# Patient Record
Sex: FEMALE | Race: WHITE | NOT HISPANIC OR LATINO | Employment: UNEMPLOYED | URBAN - METROPOLITAN AREA
[De-identification: names, ages, dates, MRNs, and addresses within clinical notes are randomized per-mention and may not be internally consistent; named-entity substitution may affect disease eponyms.]

---

## 2018-04-02 ENCOUNTER — HOSPITAL ENCOUNTER (EMERGENCY)
Facility: HOSPITAL | Age: 28
Discharge: HOME | End: 2018-04-02
Attending: EMERGENCY MEDICINE | Admitting: EMERGENCY MEDICINE

## 2018-04-02 ENCOUNTER — APPOINTMENT (OUTPATIENT)
Dept: ULTRASOUND IMAGING | Facility: HOSPITAL | Age: 28
End: 2018-04-02

## 2018-04-02 VITALS
HEIGHT: 66 IN | SYSTOLIC BLOOD PRESSURE: 101 MMHG | TEMPERATURE: 98.4 F | OXYGEN SATURATION: 95 % | BODY MASS INDEX: 44.68 KG/M2 | HEART RATE: 69 BPM | DIASTOLIC BLOOD PRESSURE: 71 MMHG | WEIGHT: 278 LBS | RESPIRATION RATE: 18 BRPM

## 2018-04-02 DIAGNOSIS — R10.10 PAIN OF UPPER ABDOMEN: Primary | ICD-10-CM

## 2018-04-02 LAB
ALBUMIN SERPL-MCNC: 4.7 G/DL (ref 3.5–5.2)
ALBUMIN/GLOB SERPL: 1.7 G/DL
ALP SERPL-CCNC: 67 U/L (ref 39–117)
ALT SERPL W P-5'-P-CCNC: 51 U/L (ref 1–33)
ANION GAP SERPL CALCULATED.3IONS-SCNC: 17.6 MMOL/L
AST SERPL-CCNC: 44 U/L (ref 1–32)
BASOPHILS # BLD AUTO: 0.03 10*3/MM3 (ref 0–0.2)
BASOPHILS NFR BLD AUTO: 0.5 % (ref 0–1.5)
BILIRUB SERPL-MCNC: 0.3 MG/DL (ref 0.1–1.2)
BUN BLD-MCNC: 10 MG/DL (ref 6–20)
BUN/CREAT SERPL: 18.9 (ref 7–25)
CALCIUM SPEC-SCNC: 9.8 MG/DL (ref 8.6–10.5)
CHLORIDE SERPL-SCNC: 102 MMOL/L (ref 98–107)
CO2 SERPL-SCNC: 23.4 MMOL/L (ref 22–29)
CREAT BLD-MCNC: 0.53 MG/DL (ref 0.57–1)
DEPRECATED RDW RBC AUTO: 45.4 FL (ref 37–54)
EOSINOPHIL # BLD AUTO: 0.11 10*3/MM3 (ref 0–0.7)
EOSINOPHIL NFR BLD AUTO: 1.7 % (ref 0.3–6.2)
ERYTHROCYTE [DISTWIDTH] IN BLOOD BY AUTOMATED COUNT: 13.3 % (ref 11.7–13)
GFR SERPL CREATININE-BSD FRML MDRD: 141 ML/MIN/1.73
GLOBULIN UR ELPH-MCNC: 2.7 GM/DL
GLUCOSE BLD-MCNC: 119 MG/DL (ref 65–99)
HCG SERPL QL: NEGATIVE
HCT VFR BLD AUTO: 41 % (ref 35.6–45.5)
HGB BLD-MCNC: 13.1 G/DL (ref 11.9–15.5)
IMM GRANULOCYTES # BLD: 0.02 10*3/MM3 (ref 0–0.03)
IMM GRANULOCYTES NFR BLD: 0.3 % (ref 0–0.5)
LIPASE SERPL-CCNC: 19 U/L (ref 13–60)
LYMPHOCYTES # BLD AUTO: 2.03 10*3/MM3 (ref 0.9–4.8)
LYMPHOCYTES NFR BLD AUTO: 32.2 % (ref 19.6–45.3)
MCH RBC QN AUTO: 29.9 PG (ref 26.9–32)
MCHC RBC AUTO-ENTMCNC: 32 G/DL (ref 32.4–36.3)
MCV RBC AUTO: 93.6 FL (ref 80.5–98.2)
MONOCYTES # BLD AUTO: 0.66 10*3/MM3 (ref 0.2–1.2)
MONOCYTES NFR BLD AUTO: 10.5 % (ref 5–12)
NEUTROPHILS # BLD AUTO: 3.45 10*3/MM3 (ref 1.9–8.1)
NEUTROPHILS NFR BLD AUTO: 54.8 % (ref 42.7–76)
PLATELET # BLD AUTO: 272 10*3/MM3 (ref 140–500)
PMV BLD AUTO: 11.1 FL (ref 6–12)
POTASSIUM BLD-SCNC: 4.2 MMOL/L (ref 3.5–5.2)
PROT SERPL-MCNC: 7.4 G/DL (ref 6–8.5)
RBC # BLD AUTO: 4.38 10*6/MM3 (ref 3.9–5.2)
SODIUM BLD-SCNC: 143 MMOL/L (ref 136–145)
WBC NRBC COR # BLD: 6.3 10*3/MM3 (ref 4.5–10.7)

## 2018-04-02 PROCEDURE — 83690 ASSAY OF LIPASE: CPT | Performed by: EMERGENCY MEDICINE

## 2018-04-02 PROCEDURE — 76705 ECHO EXAM OF ABDOMEN: CPT

## 2018-04-02 PROCEDURE — 85025 COMPLETE CBC W/AUTO DIFF WBC: CPT | Performed by: EMERGENCY MEDICINE

## 2018-04-02 PROCEDURE — 80053 COMPREHEN METABOLIC PANEL: CPT | Performed by: EMERGENCY MEDICINE

## 2018-04-02 PROCEDURE — 84703 CHORIONIC GONADOTROPIN ASSAY: CPT | Performed by: EMERGENCY MEDICINE

## 2018-04-02 PROCEDURE — 99283 EMERGENCY DEPT VISIT LOW MDM: CPT

## 2018-04-02 RX ORDER — OMEPRAZOLE 40 MG/1
40 CAPSULE, DELAYED RELEASE ORAL DAILY
Qty: 15 CAPSULE | Refills: 0 | Status: SHIPPED | OUTPATIENT
Start: 2018-04-02 | End: 2023-01-19

## 2018-04-02 RX ORDER — SODIUM CHLORIDE 0.9 % (FLUSH) 0.9 %
10 SYRINGE (ML) INJECTION AS NEEDED
Status: DISCONTINUED | OUTPATIENT
Start: 2018-04-02 | End: 2018-04-02 | Stop reason: HOSPADM

## 2018-04-02 RX ORDER — FAMOTIDINE 20 MG/1
20 TABLET, FILM COATED ORAL ONCE
Status: COMPLETED | OUTPATIENT
Start: 2018-04-02 | End: 2018-04-02

## 2018-04-02 RX ORDER — SODIUM CHLORIDE 9 MG/ML
125 INJECTION, SOLUTION INTRAVENOUS CONTINUOUS
Status: DISCONTINUED | OUTPATIENT
Start: 2018-04-02 | End: 2018-04-02 | Stop reason: HOSPADM

## 2018-04-02 RX ORDER — DICYCLOMINE HYDROCHLORIDE 10 MG/1
10 CAPSULE ORAL ONCE
Status: COMPLETED | OUTPATIENT
Start: 2018-04-02 | End: 2018-04-02

## 2018-04-02 RX ORDER — DICYCLOMINE HYDROCHLORIDE 10 MG/1
10 CAPSULE ORAL
Qty: 40 CAPSULE | Refills: 0 | Status: SHIPPED | OUTPATIENT
Start: 2018-04-02 | End: 2023-01-19

## 2018-04-02 RX ORDER — ONDANSETRON 2 MG/ML
4 INJECTION INTRAMUSCULAR; INTRAVENOUS ONCE
Status: DISCONTINUED | OUTPATIENT
Start: 2018-04-02 | End: 2018-04-02 | Stop reason: HOSPADM

## 2018-04-02 RX ORDER — ONDANSETRON 4 MG/1
4 TABLET, FILM COATED ORAL EVERY 8 HOURS PRN
Qty: 10 TABLET | Refills: 0 | Status: SHIPPED | OUTPATIENT
Start: 2018-04-02 | End: 2023-01-19

## 2018-04-02 RX ADMIN — FAMOTIDINE 20 MG: 20 TABLET, FILM COATED ORAL at 05:33

## 2018-04-02 RX ADMIN — DICYCLOMINE HYDROCHLORIDE 10 MG: 10 CAPSULE ORAL at 05:33

## 2018-04-02 NOTE — ED PROVIDER NOTES
" EMERGENCY DEPARTMENT ENCOUNTER    CHIEF COMPLAINT  Chief Complaint: Abd pain  History given by: Pt  History limited by: Nothing  Room Number: 04/04  PMD: No Known Provider      HPI:  Pt is a 27 y.o. female who presents complaining of intermittent upper abd pain for one year, which the pt describes as \"gallbladder attacks\". The pt has had an episode for the last 3 days, which is unusual. The pt confirms fever, nausea, and diarrhea and denies vomiting. The pt states that a heating pad can improve the pain. The pt states that the pain usually starts before the pt has a BM.    Duration:  3 days (acute)  Onset: Gradual  Timing: Intermittent  Location: Upper abd  Radiation: None  Quality: \"pain\"  Intensity/Severity: Moderate  Progression: No change  Associated Symptoms: Fever, nausea, and diarrhea  Aggravating Factors: BM  Alleviating Factors: Heating pad  Previous Episodes: The pt has had intermittent episodes of the pain for 1 year.   Treatment before arrival: The pt has used heating pads for relief of the pain.     PAST MEDICAL HISTORY  Active Ambulatory Problems     Diagnosis Date Noted   • No Active Ambulatory Problems     Resolved Ambulatory Problems     Diagnosis Date Noted   • No Resolved Ambulatory Problems     Past Medical History:   Diagnosis Date   • Asthma        PAST SURGICAL HISTORY  Past Surgical History:   Procedure Laterality Date   • BREAST IMPLANT SURGERY     • LIPECTOMY         FAMILY HISTORY  History reviewed. No pertinent family history.    SOCIAL HISTORY  Social History     Social History   • Marital status:      Spouse name: N/A   • Number of children: N/A   • Years of education: N/A     Occupational History   • Not on file.     Social History Main Topics   • Smoking status: Never Smoker   • Smokeless tobacco: Not on file   • Alcohol use No   • Drug use: No   • Sexual activity: Defer     Other Topics Concern   • Not on file     Social History Narrative   • No narrative on file "       ALLERGIES  Review of patient's allergies indicates no known allergies.    REVIEW OF SYSTEMS  Review of Systems   Constitutional: Positive for fever.   HENT: Negative for sore throat.    Eyes: Negative.    Respiratory: Negative for cough and shortness of breath.    Cardiovascular: Negative for chest pain.   Gastrointestinal: Positive for abdominal pain, diarrhea and nausea. Negative for vomiting.   Genitourinary: Negative for dysuria.   Musculoskeletal: Negative for neck pain.   Skin: Negative for rash.   Allergic/Immunologic: Negative.    Neurological: Negative for weakness, numbness and headaches.   Hematological: Negative.    Psychiatric/Behavioral: Negative.    All other systems reviewed and are negative.      PHYSICAL EXAM  ED Triage Vitals [04/02/18 0316]   Temp Heart Rate Resp BP SpO2   98.2 °F (36.8 °C) 95 18 -- 98 %      Temp src Heart Rate Source Patient Position BP Location FiO2 (%)   Tympanic -- -- -- --       Physical Exam   Constitutional: She is oriented to person, place, and time and well-developed, well-nourished, and in no distress. No distress.   HENT:   Head: Normocephalic and atraumatic.   Eyes: EOM are normal. Pupils are equal, round, and reactive to light.   Neck: Normal range of motion. Neck supple.   Cardiovascular: Normal rate, regular rhythm and normal heart sounds.    Pulmonary/Chest: Effort normal and breath sounds normal. No respiratory distress.   Abdominal: Soft. There is tenderness (Mild) in the periumbilical area. There is no rebound and no guarding.   Musculoskeletal: Normal range of motion. She exhibits no edema.   Neurological: She is alert and oriented to person, place, and time. She has normal sensation and normal strength.   Skin: Skin is warm and dry. No rash noted.   Psychiatric: Mood and affect normal.   Nursing note and vitals reviewed.      LAB RESULTS  Lab Results (last 24 hours)     Procedure Component Value Units Date/Time    CBC & Differential [[id number]]  Collected:  04/02/18 0358    Specimen:  Blood Updated:  04/02/18 0414    Narrative:       The following orders were created for panel order CBC & Differential.  Procedure                               Abnormality         Status                     ---------                               -----------         ------                     CBC Auto Differential[[id number]]        Abnormal            Final result                 Please view results for these tests on the individual orders.    Comprehensive Metabolic Panel [[id number]]  (Abnormal) Collected:  04/02/18 0358    Specimen:  Blood Updated:  04/02/18 0448     Glucose 119 (H) mg/dL      BUN 10 mg/dL      Creatinine 0.53 (L) mg/dL      Sodium 143 mmol/L      Potassium 4.2 mmol/L      Chloride 102 mmol/L      CO2 23.4 mmol/L      Calcium 9.8 mg/dL      Total Protein 7.4 g/dL      Albumin 4.70 g/dL      ALT (SGPT) 51 (H) U/L      AST (SGOT) 44 (H) U/L      Alkaline Phosphatase 67 U/L      Total Bilirubin 0.3 mg/dL      eGFR Non African Amer 141 mL/min/1.73      Globulin 2.7 gm/dL      A/G Ratio 1.7 g/dL      BUN/Creatinine Ratio 18.9     Anion Gap 17.6 mmol/L     Lipase [[id number]]  (Normal) Collected:  04/02/18 0358    Specimen:  Blood Updated:  04/02/18 0430     Lipase 19 U/L     hCG, Serum, Qualitative [[id number]]  (Normal) Collected:  04/02/18 0358    Specimen:  Blood Updated:  04/02/18 0428     HCG Qualitative Negative    CBC Auto Differential [[id number]]  (Abnormal) Collected:  04/02/18 0358    Specimen:  Blood Updated:  04/02/18 0414     WBC 6.30 10*3/mm3      RBC 4.38 10*6/mm3      Hemoglobin 13.1 g/dL      Hematocrit 41.0 %      MCV 93.6 fL      MCH 29.9 pg      MCHC 32.0 (L) g/dL      RDW 13.3 (H) %      RDW-SD 45.4 fl      MPV 11.1 fL      Platelets 272 10*3/mm3      Neutrophil % 54.8 %      Lymphocyte % 32.2 %      Monocyte % 10.5 %      Eosinophil % 1.7 %      Basophil % 0.5 %      Immature Grans % 0.3 %      Neutrophils, Absolute 3.45 10*3/mm3       Lymphocytes, Absolute 2.03 10*3/mm3      Monocytes, Absolute 0.66 10*3/mm3      Eosinophils, Absolute 0.11 10*3/mm3      Basophils, Absolute 0.03 10*3/mm3      Immature Grans, Absolute 0.02 10*3/mm3           I ordered the above labs and reviewed the results    RADIOLOGY  US Gallbladder   Preliminary Result   No gallstones, no evidence for acute cholecystitis.                I ordered the above noted radiological studies. Interpreted by radiologist. Reviewed by me in PACS.       PROCEDURES  Procedures      PROGRESS AND CONSULTS  ED Course   0327 Ordered CMP, lipase, CBC, and US gallbladder for further evaluation. Ordered Zofran for nausea.     0457 Rechecked the patient and she is resting. Discussed pertinent lab and imaging results, including negative US gallbladder and lab results that show slightly elevated liver tests, which is overall normal for the American diet. Discussed the plan to discharge the pt with a plan for her to get a HIDA scan and scope outpatient with a GI physician for further evaluation of her gallbladder. Patient agrees with plan and all questions were addressed.     MEDICAL DECISION MAKING  Results were reviewed/discussed with the patient and they were also made aware of online access. Pt also made aware that some labs, such as cultures, will not be resulted during ER visit and follow up with PMD is necessary.     MDM  Number of Diagnoses or Management Options     Amount and/or Complexity of Data Reviewed  Clinical lab tests: ordered and reviewed (Labs unremarkable)  Tests in the radiology section of CPT®: ordered and reviewed (US gallbladder: Negative)  Decide to obtain previous medical records or to obtain history from someone other than the patient: yes    Patient Progress  Patient progress: stable         DIAGNOSIS  Final diagnoses:   Pain of upper abdomen       DISPOSITION  Disposition: Discharged.    Patient discharged in stable condition.    Reviewed implications of results, diagnosis,  meds, responsibility to follow up, warning signs and symptoms of possible worsening, potential complications and reasons to return to ER, including new or worsening symptoms.    Patient/Family voiced understanding of above instructions.    Discussed plan for discharge, as there is no emergent indication for admission.  Pt/family is agreeable and understands need for follow up and repeat testing.  Pt is aware that discharge does not mean that nothing is wrong but it indicates no emergency is present and they must continue care with follow-up as given below or physician of their choice.     FOLLOW-UP  Lucas Mcdowell MD  [address] [address]  [phone]    Call            Medication List      New Prescriptions    dicyclomine 10 MG capsule  Commonly known as:  BENTYL  Take 1 capsule by mouth 4 (Four) Times a Day Before Meals & at Bedtime.     omeprazole 40 MG capsule  Commonly known as:  priLOSEC  Take 1 capsule by mouth Daily.     ondansetron 4 MG tablet  Commonly known as:  ZOFRAN  Take 1 tablet by mouth Every 8 (Eight) Hours As Needed for Nausea.            Latest Documented Vital Signs:  As of 4:59 AM  BP- 116/74 HR- 95 Temp- 98.2 °F (36.8 °C) (Tympanic) O2 sat- 98%    --  Documentation assistance provided by lore Koo for Dr. Mcclendon.  Information recorded by the scribeva was done at my direction and has been verified and validated by me.     Mary Koo  04/02/18 0406       Mary Koo  04/02/18 0459       Dane Mcclendon MD  04/02/18 0686

## 2018-04-02 NOTE — ED TRIAGE NOTES
Pt ambulated to triage with steady gait.  Pt reports periumblicial pain X1 year; worsened since friday.  Issues with gallbladder in the past.  Pt states that she has been having issues with diarrhea as well.  Pt is alert and oriented X4, PERRLA, respirations are even and unlabored, chest rise and fall is equal in expansion.  Pt does not appear to be in distress at this time.  Pt denies fever, chills, blurred vision, chest pain, loss in control of bowel/bladder.  Pt appears stable.  First look completed.

## 2019-04-25 ENCOUNTER — TELEPHONE (OUTPATIENT)
Dept: OBSTETRICS AND GYNECOLOGY | Facility: CLINIC | Age: 29
End: 2019-04-25

## 2019-07-09 ENCOUNTER — OFFICE VISIT (OUTPATIENT)
Dept: GASTROENTEROLOGY | Facility: CLINIC | Age: 29
End: 2019-07-09

## 2019-07-09 VITALS
SYSTOLIC BLOOD PRESSURE: 122 MMHG | DIASTOLIC BLOOD PRESSURE: 76 MMHG | BODY MASS INDEX: 48.82 KG/M2 | HEIGHT: 65 IN | WEIGHT: 293 LBS

## 2019-07-09 DIAGNOSIS — R10.30 LOWER ABDOMINAL PAIN: Primary | ICD-10-CM

## 2019-07-09 DIAGNOSIS — R12 HEARTBURN: ICD-10-CM

## 2019-07-09 PROCEDURE — 99204 OFFICE O/P NEW MOD 45 MIN: CPT | Performed by: INTERNAL MEDICINE

## 2019-07-09 RX ORDER — METRONIDAZOLE 500 MG/1
500 TABLET ORAL 3 TIMES DAILY
Qty: 21 TABLET | Refills: 0 | Status: SHIPPED | OUTPATIENT
Start: 2019-07-09 | End: 2023-01-19

## 2019-07-09 NOTE — PROGRESS NOTES
Chief Complaint   Patient presents with   • Abdominal Pain   • Heartburn       Ivonne Ingram is a 28 y.o. female who presents with abdominal pain, diarrhea for 3 years    28-year-old female with abdominal pain and diarrhea for 3 years.  Abdominal pain is worse with movement and eating better after bowel movements.  She has 3 or 4 loose stools a day.  Occasional rectal bleeding.  Her abdominal pain is in the lower quadrants and does not radiate.  She had a CBC, CMP and ultrasound of the gallbladder 1 year ago that were normal        Past Medical History:   Diagnosis Date   • Asthma        Past Surgical History:   Procedure Laterality Date   • BREAST IMPLANT SURGERY     • LIPECTOMY           Current Outpatient Medications:   •  albuterol (PROVENTIL HFA;VENTOLIN HFA) 108 (90 BASE) MCG/ACT inhaler, Inhale 2 puffs Every 4 (Four) Hours As Needed for wheezing., Disp: , Rfl:   •  dicyclomine (BENTYL) 10 MG capsule, Take 1 capsule by mouth 4 (Four) Times a Day Before Meals & at Bedtime., Disp: 40 capsule, Rfl: 0  •  omeprazole (priLOSEC) 40 MG capsule, Take 1 capsule by mouth Daily., Disp: 15 capsule, Rfl: 0  •  ondansetron (ZOFRAN) 4 MG tablet, Take 1 tablet by mouth Every 8 (Eight) Hours As Needed for Nausea., Disp: 10 tablet, Rfl: 0  •  phenazopyridine (PYRIDIUM) 200 MG tablet, One tablet 3 x's a day as needed for symptoms, Disp: 10 tablet, Rfl: 0    No Known Allergies    Social History     Socioeconomic History   • Marital status:      Spouse name: Not on file   • Number of children: Not on file   • Years of education: Not on file   • Highest education level: Not on file   Tobacco Use   • Smoking status: Never Smoker   Substance and Sexual Activity   • Alcohol use: No   • Drug use: No   • Sexual activity: Defer       History reviewed. No pertinent family history.    Review of Systems   Gastrointestinal: Positive for abdominal distention, abdominal pain, diarrhea and nausea.   All other systems reviewed and are  negative.      Vitals:    07/09/19 0913   BP: 122/76       Physical Exam   Constitutional: She is oriented to person, place, and time. She appears well-developed and well-nourished.   HENT:   Head: Normocephalic and atraumatic.   Eyes: Pupils are equal, round, and reactive to light.   Cardiovascular: Normal rate, regular rhythm and normal heart sounds.   Pulmonary/Chest: Effort normal and breath sounds normal.   Abdominal: Soft. Bowel sounds are normal. She exhibits no shifting dullness, no distension, no pulsatile liver, no fluid wave, no abdominal bruit, no ascites, no pulsatile midline mass and no mass. There is no hepatosplenomegaly. There is no tenderness. There is no rigidity and no guarding. No hernia.   Musculoskeletal: Normal range of motion.   Neurological: She is alert and oriented to person, place, and time.   Skin: Skin is warm and dry.   Psychiatric: She has a normal mood and affect. Her behavior is normal. Thought content normal.   Nursing note and vitals reviewed.      Problem list    Abdominal pain  Diarrhea  Excessive belching with foul older  GERD      Assessment/Plan    based on the above issues we will plan for colonoscopy and EGD  Excessive belching can be associated with Giardia, she describes an unusual taste in her mouth after belching that does sound like Giardia, I do think we will treat her with Flagyl for 7 days  She can use FDgard as needed  She can use IBgard as needed  She has failed Bentyl therapy in the past I envision using Xifaxan in the future for IBS-D

## 2019-07-18 ENCOUNTER — OUTSIDE FACILITY SERVICE (OUTPATIENT)
Dept: GASTROENTEROLOGY | Facility: CLINIC | Age: 29
End: 2019-07-18

## 2019-07-18 PROCEDURE — OUTSIDEPOS PR OUTSIDE POS PLACEHOLDER: Performed by: INTERNAL MEDICINE

## 2021-04-16 ENCOUNTER — BULK ORDERING (OUTPATIENT)
Dept: CASE MANAGEMENT | Facility: OTHER | Age: 31
End: 2021-04-16

## 2021-04-16 DIAGNOSIS — Z23 IMMUNIZATION DUE: ICD-10-CM

## 2022-02-03 ENCOUNTER — OFFICE VISIT (OUTPATIENT)
Dept: GASTROENTEROLOGY | Facility: CLINIC | Age: 32
End: 2022-02-03

## 2022-02-03 VITALS — HEIGHT: 64 IN | BODY MASS INDEX: 50.02 KG/M2 | WEIGHT: 293 LBS

## 2022-02-03 DIAGNOSIS — E66.01 MORBID OBESITY: ICD-10-CM

## 2022-02-03 DIAGNOSIS — R79.89 ELEVATED LIVER FUNCTION TESTS: Primary | ICD-10-CM

## 2022-02-03 PROCEDURE — 99442 PR PHYS/QHP TELEPHONE EVALUATION 11-20 MIN: CPT | Performed by: NURSE PRACTITIONER

## 2022-02-07 ENCOUNTER — TELEPHONE (OUTPATIENT)
Dept: GASTROENTEROLOGY | Facility: CLINIC | Age: 32
End: 2022-02-07

## 2022-02-07 NOTE — TELEPHONE ENCOUNTER
----- Message from LLOYD Johnson sent at 2/3/2022 10:31 AM EST -----  Need to schedule an office lab draw once she is outside of her Covid quarantine timeframe as well as liver ultrasound.

## 2022-02-15 ENCOUNTER — APPOINTMENT (OUTPATIENT)
Dept: ULTRASOUND IMAGING | Facility: HOSPITAL | Age: 32
End: 2022-02-15

## 2022-06-02 NOTE — PROGRESS NOTES
Chief Complaint   Patient presents with   • Elevated Hepatic Enzymes       HPI     You have chosen to receive care through a telephone visit. Do you consent to use a telephone visit for your medical care today? yes    Ivonne Ingram is a  31 y.o. female here for a follow up visit for elevated liver function test.    Patient follows with Dr. Devine, new to me.    Reviewed labs from primary care provider's office drawn on January 5 --, , ALP 80, Keaton 80. .  Triglycerides 288, cholesterol 299, .    Patient tested positive for Covid 3 days ago.    Patient denies risk factors for liver disease however her BMI is over 56.  She has never had a blood transfusion, she does not drink alcohol, she denies history of IV drug use, she has never been exposed to hepatitis, denies tattoo work.  She rarely uses NSAIDs.    She does admit that she never followed through with endoscopic examination after she saw Dr. Devine last because she was fearful of procedures.  She reports feeling well for about a year after her last office visit but then get episodes of generalized abdominal discomfort associated with defecation.  These episodes are less severe than before and less frequent.  No rectal bleeding.  No diarrhea.    No upper GI complaints.    She is working with her primary care provider to correct TSH.     Past Medical History:   Diagnosis Date   • Asthma        Past Surgical History:   Procedure Laterality Date   • BREAST IMPLANT SURGERY     • LIPECTOMY         Scheduled Meds:     Continuous Infusions: No current facility-administered medications for this visit.      PRN Meds:     Allergies   Allergen Reactions   • Amoxicillin-Pot Clavulanate Nausea And Vomiting       Social History     Socioeconomic History   • Marital status:    Tobacco Use   • Smoking status: Never Smoker   Substance and Sexual Activity   • Alcohol use: No   • Drug use: No   • Sexual activity: Defer       History reviewed. No  3 pertinent family history.    Review of Systems   Constitutional: Negative for activity change, appetite change, fatigue, fever and unexpected weight change.   HENT: Negative for trouble swallowing.    Respiratory: Negative for apnea, cough, choking, chest tightness, shortness of breath and wheezing.    Cardiovascular: Negative for chest pain, palpitations and leg swelling.   Gastrointestinal: Positive for abdominal pain. Negative for abdominal distention, anal bleeding, blood in stool, constipation, diarrhea, nausea, rectal pain and vomiting.       There were no vitals filed for this visit.    Physical Exam unable to be performed as visit was done over the phone.    Assessment    Diagnoses and all orders for this visit:    1. Elevated liver function tests (Primary)  -     Alpha - 1 - Antitrypsin  -     SERGIO  -     Anti-Smooth Muscle Antibody Titer  -     Ceruloplasmin  -     Gamma GT  -     Hemochromatosis Mutation  -     Hepatitis Panel, Acute  -     Mitochondrial Antibodies, M2  -     US Liver  -     US Gallbladder  -     Celiac Comprehensive Panel  -     Iron and TIBC  -     Ferritin  -     Protein Elec + Interp, Serum  -     WHITE Fibrosure    2. Morbid obesity (HCC)  -     WHITE Fibrosure       Plan    Pleasant 31-year-old female seen today via telehealth visit/phone call for elevated liver function test.  Suspect fatty liver disease in the setting of morbid obesity but will arrange serologic work-up to rule out autoimmune or viral component as well as metabolic issues.  Arrange dedicated liver ultrasound.  Depending on the quality of ultrasound given her body habitus we may need to move to CT versus MR imaging.  Encourage low-fat diet, weight loss, the avoidance of NSAIDs and alcohol in the interim.  Further recommendations and follow-up pending aforementioned work-up.    (Telehealth visit/phone call 25 minutes duration)         LLOYD Johnson  [facility]  [address] [place] [address] - [facility]  [address]  [place] [address]  Office: [phone]

## 2022-07-21 ENCOUNTER — OFFICE VISIT (OUTPATIENT)
Dept: OBSTETRICS AND GYNECOLOGY | Facility: CLINIC | Age: 32
End: 2022-07-21

## 2022-07-21 VITALS
DIASTOLIC BLOOD PRESSURE: 83 MMHG | HEIGHT: 64 IN | WEIGHT: 293 LBS | SYSTOLIC BLOOD PRESSURE: 119 MMHG | BODY MASS INDEX: 50.02 KG/M2

## 2022-07-21 DIAGNOSIS — Z11.3 SCREENING EXAMINATION FOR STD (SEXUALLY TRANSMITTED DISEASE): ICD-10-CM

## 2022-07-21 DIAGNOSIS — E28.2 PCOS (POLYCYSTIC OVARIAN SYNDROME): ICD-10-CM

## 2022-07-21 DIAGNOSIS — R10.819 SUPRAPUBIC TENDERNESS: ICD-10-CM

## 2022-07-21 DIAGNOSIS — N93.9 ABNORMAL UTERINE BLEEDING (AUB): Primary | ICD-10-CM

## 2022-07-21 DIAGNOSIS — Z12.4 CERVICAL CANCER SCREENING: ICD-10-CM

## 2022-07-21 PROCEDURE — 99203 OFFICE O/P NEW LOW 30 MIN: CPT | Performed by: STUDENT IN AN ORGANIZED HEALTH CARE EDUCATION/TRAINING PROGRAM

## 2022-07-23 LAB
BACTERIA UR CULT: NORMAL
BACTERIA UR CULT: NORMAL

## 2022-07-25 ENCOUNTER — PATIENT ROUNDING (BHMG ONLY) (OUTPATIENT)
Dept: OBSTETRICS AND GYNECOLOGY | Facility: CLINIC | Age: 32
End: 2022-07-25

## 2022-07-25 ENCOUNTER — PATIENT MESSAGE (OUTPATIENT)
Dept: OBSTETRICS AND GYNECOLOGY | Facility: CLINIC | Age: 32
End: 2022-07-25

## 2022-07-25 LAB
C TRACH RRNA CVX QL NAA+PROBE: NEGATIVE
CONV .: NORMAL
CYTOLOGIST CVX/VAG CYTO: NORMAL
CYTOLOGY CVX/VAG DOC CYTO: NORMAL
CYTOLOGY CVX/VAG DOC THIN PREP: NORMAL
DX ICD CODE: NORMAL
HIV 1 & 2 AB SER-IMP: NORMAL
N GONORRHOEA RRNA CVX QL NAA+PROBE: NEGATIVE
OTHER STN SPEC: NORMAL
STAT OF ADQ CVX/VAG CYTO-IMP: NORMAL
T VAGINALIS RRNA SPEC QL NAA+PROBE: NEGATIVE

## 2022-07-25 NOTE — PROGRESS NOTES
My chart message has been sent to the patient for PATIENT ROUNDING with [facility] [place].

## 2022-09-15 ENCOUNTER — OFFICE VISIT (OUTPATIENT)
Dept: OBSTETRICS AND GYNECOLOGY | Facility: CLINIC | Age: 32
End: 2022-09-15

## 2022-09-15 VITALS
SYSTOLIC BLOOD PRESSURE: 126 MMHG | DIASTOLIC BLOOD PRESSURE: 76 MMHG | BODY MASS INDEX: 50.02 KG/M2 | HEIGHT: 64 IN | WEIGHT: 293 LBS

## 2022-09-15 DIAGNOSIS — N93.9 ABNORMAL UTERINE BLEEDING (AUB): Primary | ICD-10-CM

## 2022-09-15 DIAGNOSIS — L08.9 SKIN INFECTION: ICD-10-CM

## 2022-09-15 DIAGNOSIS — N64.89 BREAST ASYMMETRY IN FEMALE: ICD-10-CM

## 2022-09-15 DIAGNOSIS — Z98.82 PRESENCE OF LEFT BREAST IMPLANT: ICD-10-CM

## 2022-09-15 DIAGNOSIS — B37.9 YEAST INFECTION: ICD-10-CM

## 2022-09-15 PROCEDURE — 99213 OFFICE O/P EST LOW 20 MIN: CPT | Performed by: STUDENT IN AN ORGANIZED HEALTH CARE EDUCATION/TRAINING PROGRAM

## 2022-09-15 RX ORDER — CLINDAMYCIN PHOSPHATE 10 MG/G
1 GEL TOPICAL 2 TIMES DAILY
Qty: 60 G | Refills: 0 | Status: SHIPPED | OUTPATIENT
Start: 2022-09-15 | End: 2022-09-22

## 2022-09-15 RX ORDER — MEDROXYPROGESTERONE ACETATE 10 MG/1
10 TABLET ORAL DAILY
Qty: 10 TABLET | Refills: 0 | Status: SHIPPED | OUTPATIENT
Start: 2022-09-15 | End: 2022-11-03

## 2022-09-15 RX ORDER — FLUCONAZOLE 150 MG/1
150 TABLET ORAL
Qty: 2 TABLET | Refills: 0 | Status: SHIPPED | OUTPATIENT
Start: 2022-09-15 | End: 2023-01-19

## 2022-09-23 ENCOUNTER — TELEPHONE (OUTPATIENT)
Dept: OBSTETRICS AND GYNECOLOGY | Facility: CLINIC | Age: 32
End: 2022-09-23

## 2022-09-23 NOTE — TELEPHONE ENCOUNTER
l     Caller: Ivonne Ingram    Relationship to patient: Self    Best call back number: [phone]    [facility] A PROVIDER AT THE CLINIC THEY WILL BE CLOSING OUT THIS REFERRAL.

## 2022-11-03 RX ORDER — MEDROXYPROGESTERONE ACETATE 10 MG/1
10 TABLET ORAL DAILY
Qty: 10 TABLET | Refills: 0 | Status: SHIPPED | OUTPATIENT
Start: 2022-11-03 | End: 2022-11-13

## 2023-01-04 ENCOUNTER — TELEPHONE (OUTPATIENT)
Dept: OBSTETRICS AND GYNECOLOGY | Facility: CLINIC | Age: 33
End: 2023-01-04
Payer: COMMERCIAL

## 2023-01-04 DIAGNOSIS — L08.9 SKIN INFECTION: Primary | ICD-10-CM

## 2023-01-04 NOTE — TELEPHONE ENCOUNTER
New Ob pt, believes LMP was 10/1/2022 but has abnormal bleeding, so cannot be certain-called to schedule initial visit for 1/19/2023.      Pt was seen at [facility] ED on 1/1/23, dx'd w/UTI.    Asking if she can still receive an alternative medication to the clindagel rx'd in September-she was informed she would get a new rx, but never received it, is just now following up on it.     Please advise.     Pt # [phone]

## 2023-01-08 RX ORDER — CLINDAMYCIN PHOSPHATE 11.9 MG/ML
SOLUTION TOPICAL 2 TIMES DAILY
Qty: 30 ML | Refills: 0 | Status: SHIPPED | OUTPATIENT
Start: 2023-01-08 | End: 2023-01-15

## 2023-01-18 ENCOUNTER — TELEPHONE (OUTPATIENT)
Dept: OBSTETRICS AND GYNECOLOGY | Facility: CLINIC | Age: 33
End: 2023-01-18
Payer: COMMERCIAL

## 2023-01-18 NOTE — TELEPHONE ENCOUNTER
Pt informed.  She did not specify what she was going to do today, but implied she will keep her new Ob appt for tomorrow.

## 2023-01-18 NOTE — TELEPHONE ENCOUNTER
Patient would like to know if she can be seen today, She is having bad pain in her lower abdomen she went to the ED for the same pain and was told she has UTI and was gave some antibiotics to treat but is still experiencing some pain.      Please advise,  Thank you

## 2023-01-18 NOTE — TELEPHONE ENCOUNTER
There is not a way I could work her in today and I don't know if Wilma has any spots. I see she is on for tomorrow. If she can wait until tomorrow then advise her to, otherwise have her go to urgent care. Thanks!

## 2023-01-19 ENCOUNTER — INITIAL PRENATAL (OUTPATIENT)
Dept: OBSTETRICS AND GYNECOLOGY | Facility: CLINIC | Age: 33
End: 2023-01-19
Payer: COMMERCIAL

## 2023-01-19 VITALS — SYSTOLIC BLOOD PRESSURE: 118 MMHG | BODY MASS INDEX: 51.98 KG/M2 | WEIGHT: 293 LBS | DIASTOLIC BLOOD PRESSURE: 80 MMHG

## 2023-01-19 DIAGNOSIS — E03.9 HYPOTHYROIDISM AFFECTING PREGNANCY, ANTEPARTUM: ICD-10-CM

## 2023-01-19 DIAGNOSIS — Z34.90 PREGNANCY, UNSPECIFIED GESTATIONAL AGE: Primary | ICD-10-CM

## 2023-01-19 DIAGNOSIS — K76.0 FATTY LIVER IN MOTHER DURING PREGNANCY: ICD-10-CM

## 2023-01-19 DIAGNOSIS — R10.9 ABDOMINAL PAIN AFFECTING PREGNANCY: ICD-10-CM

## 2023-01-19 DIAGNOSIS — O99.280 HYPOTHYROIDISM AFFECTING PREGNANCY, ANTEPARTUM: ICD-10-CM

## 2023-01-19 DIAGNOSIS — O26.899 ABDOMINAL PAIN AFFECTING PREGNANCY: ICD-10-CM

## 2023-01-19 DIAGNOSIS — O99.519 ASTHMA AFFECTING PREGNANCY, ANTEPARTUM: ICD-10-CM

## 2023-01-19 DIAGNOSIS — Z34.91 PRENATAL CARE IN FIRST TRIMESTER: ICD-10-CM

## 2023-01-19 DIAGNOSIS — O26.619 FATTY LIVER IN MOTHER DURING PREGNANCY: ICD-10-CM

## 2023-01-19 DIAGNOSIS — E66.01 MORBID OBESITY WITH BMI OF 50.0-59.9, ADULT: ICD-10-CM

## 2023-01-19 DIAGNOSIS — J45.909 ASTHMA AFFECTING PREGNANCY, ANTEPARTUM: ICD-10-CM

## 2023-01-19 LAB
B-HCG UR QL: POSITIVE
EXPIRATION DATE: ABNORMAL
GLUCOSE UR STRIP-MCNC: NEGATIVE MG/DL
INTERNAL NEGATIVE CONTROL: NEGATIVE
INTERNAL POSITIVE CONTROL: POSITIVE
Lab: ABNORMAL
PROT UR STRIP-MCNC: NEGATIVE MG/DL

## 2023-01-19 PROCEDURE — 99214 OFFICE O/P EST MOD 30 MIN: CPT | Performed by: STUDENT IN AN ORGANIZED HEALTH CARE EDUCATION/TRAINING PROGRAM

## 2023-01-19 PROCEDURE — 81025 URINE PREGNANCY TEST: CPT | Performed by: STUDENT IN AN ORGANIZED HEALTH CARE EDUCATION/TRAINING PROGRAM

## 2023-01-20 LAB
ABO GROUP BLD: ABNORMAL
ALBUMIN SERPL-MCNC: 4.4 G/DL (ref 3.9–5)
ALBUMIN/GLOB SERPL: 1.4 {RATIO} (ref 1.2–2.2)
ALP SERPL-CCNC: 65 IU/L (ref 44–121)
ALT SERPL-CCNC: 60 IU/L (ref 0–32)
AMPHETAMINES UR QL SCN: NEGATIVE NG/ML
AST SERPL-CCNC: 67 IU/L (ref 0–40)
BARBITURATES UR QL SCN: NEGATIVE NG/ML
BASOPHILS # BLD AUTO: 0.1 X10E3/UL (ref 0–0.2)
BASOPHILS NFR BLD AUTO: 1 %
BENZODIAZ UR QL SCN: NEGATIVE NG/ML
BILIRUB SERPL-MCNC: 0.3 MG/DL (ref 0–1.2)
BLD GP AB SCN SERPL QL: NEGATIVE
BUN SERPL-MCNC: 8 MG/DL (ref 6–20)
BUN/CREAT SERPL: 13 (ref 9–23)
BZE UR QL SCN: NEGATIVE NG/ML
CALCIUM SERPL-MCNC: 9.4 MG/DL (ref 8.7–10.2)
CANNABINOIDS UR QL SCN: NEGATIVE NG/ML
CHLORIDE SERPL-SCNC: 101 MMOL/L (ref 96–106)
CO2 SERPL-SCNC: 23 MMOL/L (ref 20–29)
CREAT SERPL-MCNC: 0.6 MG/DL (ref 0.57–1)
CREAT UR-MCNC: 190 MG/DL (ref 20–300)
EGFRCR SERPLBLD CKD-EPI 2021: 124 ML/MIN/1.73
EOSINOPHIL # BLD AUTO: 0.1 X10E3/UL (ref 0–0.4)
EOSINOPHIL NFR BLD AUTO: 1 %
ERYTHROCYTE [DISTWIDTH] IN BLOOD BY AUTOMATED COUNT: 15.4 % (ref 11.7–15.4)
GLOBULIN SER CALC-MCNC: 3.2 G/DL (ref 1.5–4.5)
GLUCOSE SERPL-MCNC: 91 MG/DL (ref 70–99)
HBV SURFACE AG SERPL QL IA: NEGATIVE
HCT VFR BLD AUTO: 34.4 % (ref 34–46.6)
HCV AB S/CO SERPL IA: <0.1 S/CO RATIO (ref 0–0.9)
HGB BLD-MCNC: 11.3 G/DL (ref 11.1–15.9)
HIV 1+2 AB+HIV1 P24 AG SERPL QL IA: NON REACTIVE
IMM GRANULOCYTES # BLD AUTO: 0 X10E3/UL (ref 0–0.1)
IMM GRANULOCYTES NFR BLD AUTO: 0 %
LABORATORY COMMENT REPORT: NORMAL
LYMPHOCYTES # BLD AUTO: 3.1 X10E3/UL (ref 0.7–3.1)
LYMPHOCYTES NFR BLD AUTO: 31 %
MCH RBC QN AUTO: 26.5 PG (ref 26.6–33)
MCHC RBC AUTO-ENTMCNC: 32.8 G/DL (ref 31.5–35.7)
MCV RBC AUTO: 81 FL (ref 79–97)
METHADONE UR QL SCN: NEGATIVE NG/ML
MONOCYTES # BLD AUTO: 0.6 X10E3/UL (ref 0.1–0.9)
MONOCYTES NFR BLD AUTO: 6 %
NEUTROPHILS # BLD AUTO: 6.2 X10E3/UL (ref 1.4–7)
NEUTROPHILS NFR BLD AUTO: 61 %
OPIATES UR QL SCN: NEGATIVE NG/ML
OXYCODONE+OXYMORPHONE UR QL SCN: NEGATIVE NG/ML
PCP UR QL: NEGATIVE NG/ML
PH UR: 5.4 [PH] (ref 4.5–8.9)
PLATELET # BLD AUTO: 394 X10E3/UL (ref 150–450)
POTASSIUM SERPL-SCNC: 4 MMOL/L (ref 3.5–5.2)
PROPOXYPH UR QL SCN: NEGATIVE NG/ML
PROT SERPL-MCNC: 7.6 G/DL (ref 6–8.5)
RBC # BLD AUTO: 4.27 X10E6/UL (ref 3.77–5.28)
RH BLD: POSITIVE
RPR SER QL: NON REACTIVE
RUBV IGG SERPL IA-ACNC: 2.97 INDEX
SODIUM SERPL-SCNC: 139 MMOL/L (ref 134–144)
T4 FREE SERPL-MCNC: 0.77 NG/DL (ref 0.82–1.77)
TSH SERPL DL<=0.005 MIU/L-ACNC: 11.2 UIU/ML (ref 0.45–4.5)
WBC # BLD AUTO: 10 X10E3/UL (ref 3.4–10.8)

## 2023-01-21 LAB
BACTERIA UR CULT: NORMAL
BACTERIA UR CULT: NORMAL

## 2023-01-23 ENCOUNTER — TELEPHONE (OUTPATIENT)
Dept: OBSTETRICS AND GYNECOLOGY | Facility: CLINIC | Age: 33
End: 2023-01-23
Payer: COMMERCIAL

## 2023-01-23 DIAGNOSIS — O99.280 HYPOTHYROIDISM AFFECTING PREGNANCY, ANTEPARTUM: Primary | ICD-10-CM

## 2023-01-23 DIAGNOSIS — L29.9 ITCHING: ICD-10-CM

## 2023-01-23 DIAGNOSIS — E03.9 HYPOTHYROIDISM AFFECTING PREGNANCY, ANTEPARTUM: Primary | ICD-10-CM

## 2023-01-23 RX ORDER — TRIAMCINOLONE ACETONIDE 5 MG/G
1 CREAM TOPICAL 2 TIMES DAILY
Qty: 454 G | Refills: 0 | Status: SHIPPED | OUTPATIENT
Start: 2023-01-23 | End: 2023-02-09 | Stop reason: SDUPTHER

## 2023-01-23 RX ORDER — LEVOTHYROXINE SODIUM 0.03 MG/1
25 TABLET ORAL
Qty: 30 TABLET | Refills: 1 | Status: SHIPPED | OUTPATIENT
Start: 2023-01-23 | End: 2023-02-09 | Stop reason: SDUPTHER

## 2023-01-23 NOTE — TELEPHONE ENCOUNTER
Pt called wanting to speak to you about all her lab results from 1/19 saying she should be getting medication soon as well.

## 2023-01-26 ENCOUNTER — TELEPHONE (OUTPATIENT)
Dept: OBSTETRICS AND GYNECOLOGY | Facility: CLINIC | Age: 33
End: 2023-01-26
Payer: COMMERCIAL

## 2023-01-26 LAB
CFDNA.FET/CFDNA.TOTAL SFR FETUS: NORMAL %
CITATION REF LAB TEST: NORMAL
FET 13+18+21+X+Y ANEUP PLAS.CFDNA: NEGATIVE
FET CHR 21 TS PLAS.CFDNA QL: NEGATIVE
FET SEX PLAS.CFDNA DOSAGE CFDNA: NORMAL
FET TS 13 RISK PLAS.CFDNA QL: NEGATIVE
FET TS 18 RISK WBC.DNA+CFDNA QL: NEGATIVE
GA EST FROM CONCEPTION DATE: NORMAL D
GESTATIONAL AGE > 9:: YES
LAB DIRECTOR NAME PROVIDER: NORMAL
LAB DIRECTOR NAME PROVIDER: NORMAL
LABORATORY COMMENT REPORT: NORMAL
LIMITATIONS OF THE TEST: NORMAL
NEGATIVE PREDICTIVE VALUE: NORMAL
NOTE: NORMAL
PERFORMANCE CHARACTERISTICS: NORMAL
POSITIVE PREDICTIVE VALUE: NORMAL
REF LAB TEST METHOD: NORMAL
TEST PERFORMANCE INFO SPEC: NORMAL

## 2023-01-26 NOTE — TELEPHONE ENCOUNTER
Please call and let her know that it returned low risk for Down syndrome, trisomy 18, and trisomy 13. However, there was a low level of cell free DNA that these results could be off and we should consider repeating at next visit. If she would like to know the gender, it is a boy. Thanks!

## 2023-02-03 PROBLEM — E03.9 HYPOTHYROIDISM (ACQUIRED): Status: ACTIVE | Noted: 2023-02-03

## 2023-02-03 PROBLEM — Z34.90 PREGNANCY: Status: ACTIVE | Noted: 2023-02-03

## 2023-02-09 ENCOUNTER — ROUTINE PRENATAL (OUTPATIENT)
Dept: OBSTETRICS AND GYNECOLOGY | Facility: CLINIC | Age: 33
End: 2023-02-09
Payer: COMMERCIAL

## 2023-02-09 VITALS — DIASTOLIC BLOOD PRESSURE: 78 MMHG | WEIGHT: 293 LBS | SYSTOLIC BLOOD PRESSURE: 120 MMHG | BODY MASS INDEX: 52.16 KG/M2

## 2023-02-09 DIAGNOSIS — O20.9 VAGINAL BLEEDING AFFECTING EARLY PREGNANCY: ICD-10-CM

## 2023-02-09 DIAGNOSIS — O26.619 FATTY LIVER IN MOTHER DURING PREGNANCY: ICD-10-CM

## 2023-02-09 DIAGNOSIS — K76.0 FATTY LIVER IN MOTHER DURING PREGNANCY: ICD-10-CM

## 2023-02-09 DIAGNOSIS — Z3A.12 12 WEEKS GESTATION OF PREGNANCY: Primary | ICD-10-CM

## 2023-02-09 DIAGNOSIS — O26.891 LOW BACK PAIN DURING PREGNANCY, FIRST TRIMESTER: ICD-10-CM

## 2023-02-09 DIAGNOSIS — E66.01 MORBID OBESITY WITH BMI OF 50.0-59.9, ADULT: ICD-10-CM

## 2023-02-09 DIAGNOSIS — M54.50 LOW BACK PAIN DURING PREGNANCY, FIRST TRIMESTER: ICD-10-CM

## 2023-02-09 DIAGNOSIS — L29.9 ITCHING: ICD-10-CM

## 2023-02-09 DIAGNOSIS — Z34.91 PRENATAL CARE IN FIRST TRIMESTER: ICD-10-CM

## 2023-02-09 DIAGNOSIS — N85.6 UTERINE SYNECHIAE: ICD-10-CM

## 2023-02-09 DIAGNOSIS — O99.280 HYPOTHYROIDISM AFFECTING PREGNANCY, ANTEPARTUM: ICD-10-CM

## 2023-02-09 DIAGNOSIS — E03.9 HYPOTHYROIDISM AFFECTING PREGNANCY, ANTEPARTUM: ICD-10-CM

## 2023-02-09 LAB
GLUCOSE UR STRIP-MCNC: NEGATIVE MG/DL
PROT UR STRIP-MCNC: ABNORMAL MG/DL

## 2023-02-09 PROCEDURE — 99214 OFFICE O/P EST MOD 30 MIN: CPT | Performed by: STUDENT IN AN ORGANIZED HEALTH CARE EDUCATION/TRAINING PROGRAM

## 2023-02-09 RX ORDER — TRIAMCINOLONE ACETONIDE 5 MG/G
1 CREAM TOPICAL 2 TIMES DAILY
Qty: 454 G | Refills: 0 | Status: SHIPPED | OUTPATIENT
Start: 2023-02-09

## 2023-02-09 RX ORDER — LEVOTHYROXINE SODIUM 0.03 MG/1
25 TABLET ORAL
Qty: 30 TABLET | Refills: 1 | Status: SHIPPED | OUTPATIENT
Start: 2023-02-09

## 2023-02-09 RX ORDER — ASPIRIN 81 MG/1
81 TABLET ORAL DAILY
Qty: 60 TABLET | Refills: 3 | Status: SHIPPED | OUTPATIENT
Start: 2023-02-09

## 2023-02-09 NOTE — PROGRESS NOTES
Chief Complaint   Patient presents with   • Routine Prenatal Visit      Ivonne Ingram is a 32 y.o.  at 12w5d who presents for routine prenatal visit. She has multiple complaints today. She first reports that she has starting having low back pain that sometimes radiates to her abdomen in addition to mid back pain that occurred before pregnancy. She reports that in her last 2 pregnancies, she went to physical therapy. Denies dysuria, urinary frequency, urinary urgency or blood in her urine. She is also concerned with vaginal spotting that began 2-3 days ago, initially with wiping and then she noted it on her underwear and in the toilet bowl. Denies vaginal discharge. She reports that the bleeding has started to ease up last night. Denies any today. She reports mild abdominal cramping. She thinks she has been feeling fetal movement until yesterday.   She has not yet started her synthroid medication for management of hypothyroidism.     /78   Wt (!) 138 kg (304 lb)   LMP  (LMP Unknown)   BMI 52.16 kg/m²    Gen: well appearing, NAD   Abd: gravid, nontender  Back: No CVA tenderness, mild paraspinal muscle tenderness of lumbar and lumbosacral areas. Normal ROM.  Pelvis: normal external female genitalia, normal vulva, normal appearing vaginal mucosa, normal appearing cervix without active bleeding, cervix visually closed.   See OB Flowsheet    ASSESSMENT:   1. IUP at 12w5d   2. Prenatal care in first trimester  3. Morbid obesity- BMI 52 -will plan to start ASA 81 mg daily and continue through pregnancy. Prescription for ASA 81 mg daily sent to her pharmacy.   4. Asthma affecting pregnancy- rescue albuterol inhaler as needed   5. Fatty liver disease with elevated LFTs - (ALT 60, AST 67 on 23 decreased from previous elevation on 23 with ALT 93, ), will continue to monitor LFTs every trimester.   6. Hypothyroidism affecting pregnancy - TSH 11.2, free T4 0.77 on NOB labs, patient advised to  start Synthroid 25 mcg daily which she has been non-compliant with so far. Strongly encouraged the patient to start medication as it can lead to cognitive and neuropsychologic development delays and other increased risks like preeclampsia and  delivery in pregnancy.   7. Low back pain - referral to physical therapy placed for evaluation and treatment.  8. 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 Ultrasound performed today that noted fetus measuring 12 weeks with uterine synechia visualized. Discussed that we will follow this and that it can lead to growth restriction,  labor, or abnormal placentation. Will plan to repeat ultrasound at next visit.     PLAN:  See updated Problem List   Reviewed expectations of this stage of pregnancy.   First trimester bleeding/pain precautions reviewed.  Return in about 4 weeks (around 3/9/2023) for prenatal visit and ultrasound.    Zeinab Blue MD

## 2023-02-14 ENCOUNTER — TELEPHONE (OUTPATIENT)
Dept: OBSTETRICS AND GYNECOLOGY | Facility: CLINIC | Age: 33
End: 2023-02-14
Payer: COMMERCIAL

## 2023-02-14 NOTE — TELEPHONE ENCOUNTER
Pt states she did 'something' to her back over the weekend and is now in excruciating pain. Pt says she can barely walk or move. She is wanting to be seen in our office within the next few days, I advised her she may need to go to ED. Pt says she will not go there because she doesn't want to be exposed to COVID and other germs. What do you advise I tell pt? Would you like for me to schedule her to be soon with you.     Please advise,   Thank you

## 2023-02-15 DIAGNOSIS — O99.891 BACK PAIN AFFECTING PREGNANCY IN FIRST TRIMESTER: Primary | ICD-10-CM

## 2023-02-15 DIAGNOSIS — M54.9 BACK PAIN AFFECTING PREGNANCY IN FIRST TRIMESTER: Primary | ICD-10-CM

## 2023-02-15 RX ORDER — CYCLOBENZAPRINE HCL 10 MG
10 TABLET ORAL EVERY 8 HOURS PRN
Qty: 30 TABLET | Refills: 1 | Status: SHIPPED | OUTPATIENT
Start: 2023-02-15 | End: 2024-02-15

## 2023-02-15 NOTE — TELEPHONE ENCOUNTER
13w 4d ob pt called again today to report extreme back pain.  States she can barely move.   Requesting to be seen in office.     Please advise.     Pt # [phone]

## 2023-02-15 NOTE — TELEPHONE ENCOUNTER
Pt informed of rx and to use Tylenol & warm compresses as needed.  Also reminded pt of referral to PT.  They have tried to call her once to schedule, pt states she did not get the voicemail.  Provided pt w/phone number to call to schedule w/[facility]. [phone]

## 2023-02-15 NOTE — TELEPHONE ENCOUNTER
Please let her know that I can send in a muscle relaxer that she can take and she can use tylenol as needed along with hot compresses to the back. I have already referred her to physical therapy. She should see her primary care physician if it continues and is not helped by the muscle relaxer or tylenol.

## 2023-03-09 ENCOUNTER — ROUTINE PRENATAL (OUTPATIENT)
Dept: OBSTETRICS AND GYNECOLOGY | Facility: CLINIC | Age: 33
End: 2023-03-09
Payer: COMMERCIAL

## 2023-03-09 VITALS — WEIGHT: 293 LBS | BODY MASS INDEX: 51.3 KG/M2

## 2023-03-09 DIAGNOSIS — E03.9 HYPOTHYROIDISM AFFECTING PREGNANCY, ANTEPARTUM: ICD-10-CM

## 2023-03-09 DIAGNOSIS — Z34.92 PRENATAL CARE IN SECOND TRIMESTER: ICD-10-CM

## 2023-03-09 DIAGNOSIS — Z3A.16 16 WEEKS GESTATION OF PREGNANCY: Primary | ICD-10-CM

## 2023-03-09 DIAGNOSIS — O99.280 HYPOTHYROIDISM AFFECTING PREGNANCY, ANTEPARTUM: ICD-10-CM

## 2023-03-09 DIAGNOSIS — R55 VASOVAGAL EPISODE: ICD-10-CM

## 2023-03-09 DIAGNOSIS — E66.01 MORBID OBESITY WITH BMI OF 50.0-59.9, ADULT: ICD-10-CM

## 2023-03-09 DIAGNOSIS — K76.0 FATTY LIVER DISEASE, NONALCOHOLIC: ICD-10-CM

## 2023-03-09 DIAGNOSIS — F41.9 ANXIETY: ICD-10-CM

## 2023-03-09 LAB
GLUCOSE UR STRIP-MCNC: NEGATIVE MG/DL
PROT UR STRIP-MCNC: NEGATIVE MG/DL

## 2023-03-09 RX ORDER — SERTRALINE HYDROCHLORIDE 25 MG/1
25 TABLET, FILM COATED ORAL DAILY
Qty: 30 TABLET | Refills: 1 | Status: SHIPPED | OUTPATIENT
Start: 2023-03-09

## 2023-03-09 NOTE — PROGRESS NOTES
Chief Complaint   Patient presents with   • Routine Prenatal Visit      Ivonne Ingram is a 32 y.o.  at 16w5d who presents for routine prenatal visit. She reports that she has been having episodes of feeling very hot and getting super pale then feeling like she may pass out. This also occurs with light headedness. She also has had intermittent tingling in her legs and feet swelling. Denies vaginal bleeding, cramping, contractions, LOF. She has not yet felt fetal movements.   She reports that she is struggling with anxiety and was taking hydroxyzine which was not working. She was seeing a therapist.     Wt 136 kg (299 lb)   LMP  (LMP Unknown)   BMI 51.30 kg/m²    Gen: well appearing, NAD   Abd: gravid, nontender  See OB Flowsheet    ASSESSMENT:   1. IUP at 16w5d   2. Prenatal care in second trimester  3. Morbid obesity- BMI 51- on ASA 81 mg daily   4. Asthma affecting pregnancy- rescue albuterol inhaler as needed  5. Fatty liver disease with elevated LFTs - plan to repeat at next visit.   6. Hypothyroidism affecting pregnancy- prescribed Synthroid 25 mcg daily , will plan to repeat at next visit.   7. Anxiety   8. Vasovagal episodes     PLAN:  Problem list reviewed and updated.   Reviewed expectations of this stage of pregnancy.   Second trimester precautions reviewed including  labor precautions, anticipated fetal movements.   Will start the patient on Zoloft 25 mg daily for management of anxiety and plan to increase at next visit to 50 mg daily if she is still having ongoing symptoms.   I suspect that the episodes she is experiencing with light headedness and feeling like she might pass out are vasovagal episodes. Advised the patient to increase hydration and drink electrolyte rich drinks daily. Encouraged her to change positions slowly. All questions answered.   Will plan for anatomy survey and cervical length screening at next visit.   Return in about 4 weeks (around 2023) for prenatal visit  and anatomy survey .    Patient Active Problem List    Diagnosis Date Noted   • Pregnancy 02/03/2023   • Morbid obesity with BMI of 50.0-59.9, adult (HCC) 02/03/2023   • Asthma 02/03/2023   • Hypothyroidism (acquired) 02/03/2023   • Fatty liver 02/03/2023       Orders Placed This Encounter   Procedures   • POC Urinalysis Dipstick     This is an external result entered through the Results Console.     Order Specific Question:   Release to patient     Answer:   Routine Release     Zeinab Blue MD

## 2023-04-13 ENCOUNTER — HOSPITAL ENCOUNTER (OUTPATIENT)
Dept: PHYSICAL THERAPY | Facility: HOSPITAL | Age: 33
Setting detail: THERAPIES SERIES
Discharge: HOME | End: 2023-04-13
Payer: COMMERCIAL

## 2023-04-13 ENCOUNTER — ROUTINE PRENATAL (OUTPATIENT)
Dept: OBSTETRICS AND GYNECOLOGY | Facility: CLINIC | Age: 33
End: 2023-04-13
Payer: COMMERCIAL

## 2023-04-13 VITALS — DIASTOLIC BLOOD PRESSURE: 80 MMHG | SYSTOLIC BLOOD PRESSURE: 124 MMHG | WEIGHT: 293 LBS | BODY MASS INDEX: 51.64 KG/M2

## 2023-04-13 DIAGNOSIS — O99.519 ASTHMA AFFECTING PREGNANCY, ANTEPARTUM: ICD-10-CM

## 2023-04-13 DIAGNOSIS — Z3A.21 21 WEEKS GESTATION OF PREGNANCY: Primary | ICD-10-CM

## 2023-04-13 DIAGNOSIS — M40.46 EXAGGERATED LUMBAR LORDOSIS: ICD-10-CM

## 2023-04-13 DIAGNOSIS — O99.280 HYPOTHYROIDISM AFFECTING PREGNANCY, ANTEPARTUM: ICD-10-CM

## 2023-04-13 DIAGNOSIS — J30.2 SEASONAL ALLERGIES: ICD-10-CM

## 2023-04-13 DIAGNOSIS — R79.89 ELEVATED LFTS: ICD-10-CM

## 2023-04-13 DIAGNOSIS — J45.909 ASTHMA AFFECTING PREGNANCY, ANTEPARTUM: ICD-10-CM

## 2023-04-13 DIAGNOSIS — K76.0 FATTY LIVER DISEASE, NONALCOHOLIC: ICD-10-CM

## 2023-04-13 DIAGNOSIS — E03.9 HYPOTHYROIDISM AFFECTING PREGNANCY, ANTEPARTUM: ICD-10-CM

## 2023-04-13 DIAGNOSIS — Z34.92 PRENATAL CARE IN SECOND TRIMESTER: ICD-10-CM

## 2023-04-13 DIAGNOSIS — M54.50 LOW BACK PAIN DURING PREGNANCY IN SECOND TRIMESTER: Primary | ICD-10-CM

## 2023-04-13 DIAGNOSIS — E66.01 MORBID OBESITY WITH BMI OF 50.0-59.9, ADULT: ICD-10-CM

## 2023-04-13 DIAGNOSIS — M54.50 LOW BACK PAIN DURING PREGNANCY, FIRST TRIMESTER: ICD-10-CM

## 2023-04-13 DIAGNOSIS — O26.891 LOW BACK PAIN DURING PREGNANCY, FIRST TRIMESTER: ICD-10-CM

## 2023-04-13 DIAGNOSIS — R29.898 WEAKNESS OF BOTH HIPS: ICD-10-CM

## 2023-04-13 DIAGNOSIS — O26.892 LOW BACK PAIN DURING PREGNANCY IN SECOND TRIMESTER: Primary | ICD-10-CM

## 2023-04-13 DIAGNOSIS — F41.9 ANXIETY: ICD-10-CM

## 2023-04-13 LAB
GLUCOSE UR STRIP-MCNC: NEGATIVE MG/DL
PROT UR STRIP-MCNC: ABNORMAL MG/DL

## 2023-04-13 PROCEDURE — 97110 THERAPEUTIC EXERCISES: CPT

## 2023-04-13 PROCEDURE — 97161 PT EVAL LOW COMPLEX 20 MIN: CPT

## 2023-04-13 RX ORDER — LORATADINE 10 MG/1
10 TABLET ORAL DAILY
Qty: 90 TABLET | Refills: 3 | Status: SHIPPED | OUTPATIENT
Start: 2023-04-13 | End: 2024-04-12

## 2023-04-13 NOTE — THERAPY EVALUATION
Outpatient Physical Therapy Ortho Initial Evaluation  [facility]     Patient Name: Ivonne Ingram  : 1990  MRN: [id number]  Today's Date: 2023      Visit Date: 2023    Patient Active Problem List   Diagnosis   • Pregnancy   • Morbid obesity with BMI of 50.0-59.9, adult   • Asthma   • Hypothyroidism (acquired)   • Fatty liver        Past Medical History:   Diagnosis Date   • Asthma         Past Surgical History:   Procedure Laterality Date   • BREAST IMPLANT SURGERY     • LIPECTOMY         Visit Dx:     ICD-10-CM ICD-9-CM   1. Low back pain during pregnancy in second trimester  O26.892 646.80    M54.50 724.2   2. Low back pain during pregnancy, first trimester  O26.891 646.80    M54.50 724.2   3. Weakness of both hips  R29.898 729.89   4. Exaggerated lumbar lordosis  M40.46 737.29          Patient History     Row Name 23 1300             History    Chief Complaint Pain  -MATEUSZ      Type of Pain Back pain  -MATEUSZ      Brief Description of Current Complaint Ivonne Ingram is a 32 y.o. female who presents to physical therapy today with primary compliant of low back pain that is chronic in nature. She is now 22 weeks pregnant and reports that in her last two pregnancies, she went to PT. She decribes her pain as tight/sharp in nature that will shoot down her R glute into her R lateral thigh and skip down to the base of her R foot. She expresses PT helped her in the past with STM to R lateral hip and intermittent dry-needling and mid back manipulation. She has not been able to tolerate the exercises previously prescribed during her last bout of PT. She denies N/T or BB change since the recent flare up. She reports self lumbar manipulation ~10x per day due to discomfort. Ivonne Ingram reports difficulty/increased pain with bending forward to pick something up or bathe her daughter, walking > 15 minutes, static standing with slight forward flexion > 3-4 minutes, getting on/off floor, donning  pants/shoes, upon waking in the morning and after sitting for a longer period of time. She also reports 2-3 sleep disturbances per night. Pain relieving factors include icing, massage/pressure to lower back and R lateral thigh, and tylenol. 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 PMH includes asthma, anxiety/depression. Ivonne Ingram primary goal for attending skilled physical therapy is to reduce severity of pain and risk of increase in symptoms throughout her pregnancy.  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 Yes  -MATEUSZ         Fall Risk Assessment    Any falls in the past year: No  -MATEUSZ         Daily Activities    Primary Language English  -MATEUSZ      Pt Participated in POC and Goals No  -MATEUSZ         Safety    Are you being hurt, hit, or frightened by anyone at home or in your life? No  -MATEUSZ      Are you being neglected by a caregiver No  -MATEUSZ      Have you had any of the following issues with Depression;Anxiety  -MATEUSZ            User Key  (r) = Recorded By, (t) = Taken By, (c) = Cosigned By    Initials Name Provider Type    Reshma Coburn, PT Physical Therapist                 PT Ortho     Row Name 04/13/23 1300       Posture/Observations    Alignment Options Forward head;Thoracic kyphosis;Lumbar lordosis;Genu valgus  -MATEUSZ    Forward Head Increased  -MATEUSZ    Thoracic Kyphosis Decreased  -MATEUSZ    Lumbar lordosis Increased  -MATEUSZ    Genu valgus Bilateral:;Increased  -MATEUSZ       Quarter Clearing     Quarter Clearing Lower Quarter Clearing  -MATEUSZ       Neural Tension Signs- Lower Quarter Clearing    Slump Bilateral:;Negative  -MATEUSZ       Myotomal Screen- Lower Quarter Clearing    Hip flexion (L2) Right:;4 (Good);Left:;4+ (Good +)  -MATEUSZ    Knee extension (L3) Bilateral:;4+ (Good +)  -MATEUSZ    Ankle DF (L4) Bilateral:;4+ (Good +)  -MATEUSZ    Knee flexion (S2) Bilateral:;4 (Good)  -MATEUSZ       Lumbar ROM Screen- Lower Quarter Clearing    Lumbar Flexion Impaired  50% dec, gowers sign when returning to neutral  -MATEUSZ    Lumbar Extension Normal  -MATEUSZ    Lumbar Lateral Flexion Impaired  25% dec B  -MATEUSZ    Lumbar Rotation Impaired  25% dec B, pain on R with R rot  -MATEUSZ       SI/Hip Screen- Lower Quarter Clearing    Aleksandar's/Ciro's test Bilateral:;Positive  -MATEUSZ    Posterior thigh sheer Bilateral:;Positive  -MATEUSZ       Special Tests/Palpation    Special Tests/Palpation Lumbar/SI;Hip  -MATEUSZ       Lumbosacral Accessory Motions    Lumbosacral Accessory Motions Tested? Yes  -MATEUSZ    PA Glide- L1 Right:;Left:;Hypomobile;Bilateral pain  -MATEUSZ    PA Glide- L2 WNL;Bilateral pain  -MATEUSZ    PA Glide- L3 WNL;Bilateral pain  -MATEUSZ    PA Glide- L4 Right:;Left:;Hypomobile;Bilateral pain  -MATEUSZ    PA Glide- L5 Right:;Left:;Hypomobile;Bilateral pain  -MATEUSZ       Lumbar/SI Special Tests    SLR (Neural Tension) Bilateral:;Negative  -MATEUSZ       Hip Special Tests    Farooq test (tightness of ITB) Right:;Positive  -MATEUSZ    Ely’s test (rectus femoris tightness) Bilateral:;Negative  -MATEUSZ    Hip scour test (labral vs hip pathology) Bilateral:;Negative  -MATEUSZ       MMT (Manual Muscle Testing)    Rt Lower Ext Rt Hip Extension;Rt Hip ABduction;Rt Hip External (Lateral) Rotation  -MATEUSZ    Lt Lower Ext Lt Hip Extension;Lt Hip ABduction;Lt Hip External (Lateral) Rotation  -MATEUSZ       MMT Right Lower Ext    Rt Hip Extension MMT, Gross Movement (3-/5) fair minus  -MATEUSZ    Rt Hip ABduction MMT, Gross Movement (3/5) fair  -MATEUSZ    Rt Hip External (Lateral) Rotation MMT, Gross Movement (3+/5) fair plus   -MATEUSZ       MMT Left Lower Ext    Lt Hip Extension MMT, Gross Movement (3/5) fair  -MATEUSZ    Lt Hip ABduction MMT, Gross Movement (3+/5) fair plus  -MATEUSZ    Lt Hip External (Lateral) Rotation MMT, Gross Movement (4-/5) good minus  -MATEUSZ       Sensation    Sensation WNL? WNL  -MATEUSZ       Flexibility    Flexibility Tested? Lower Extremity  -MATEUSZ       Lower Extremity Flexibility    Hamstrings Bilateral:;Mildly limited;Moderately limited  -MATEUSZ    Hip Flexors Bilateral:;Mildly limited  -MATEUSZ    ITB Right:;Moderately limited  -MATEUSZ    Hip External Rotators Bilateral:;Moderately limited  -MATEUSZ    Quadratus Lumborum Right:;Mildly limited;Moderately limited  -MATEUSZ       Gait/Stairs (Locomotion)    Comment, (Gait/Stairs) TLJ hinge, minimal trunk rotation, B genu valugs collapse, B foot pronation  -MATEUSZ          User Key  (r) = Recorded By, (t) = Taken By, (c) = Cosigned By    Initials Name Provider Type    Reshma Coburn, PT Physical Therapist                            Therapy Education  Education Details: Educated on anatomy/pathology, evaluation findings, therapy expectations, relationship between TLJ hinge, increase BMI, lumbar lordosis, muscle tension/weakness and pain, POC and HEP  Given: HEP, Symptoms/condition management, Pain management, Posture/body mechanics  Program: New  How Provided: Verbal, Demonstration, Written  Provided to: Patient  Level of Understanding: Verbalized, Demonstrated  68881 - PT Self Care/Mgmt Minutes: 5      PT OP Goals     Row Name 04/13/23 1300          PT Short Term Goals    STG Date to Achieve 05/13/23  -MATEUSZ     STG 1 Patient will be independent with education for symptom management and initial HEP to decrease LBP.  -MATEUSZ     STG 1 Progress New  -MATEUSZ     STG 2 Patient will report 25% reduction in LBP/RLE pain when donning/doffing shoes when getting dressed and when getting on/off floor to bathe her daughter to promote improved activity tolerance with ADLs/functional tasks.  -MATEUSZ     STG 2 Progress New   -        Long Term Goals    LTG Date to Achieve 06/12/23  -MATEUSZ     LTG 1 Patient will be independent with education for symptom management and advanced HEP to decrease LBP.  -MATEUSZ     LTG 1 Progress New  -MATEUSZ     LTG 2 Patient will improve walking tolerance from 15 min to >25 min with </= 4/10 LBP to improve participation in community activities.  -MATEUSZ     LTG 2 Progress New  -MATEUSZ     LTG 3 Patient will improve static standing tolerance from 3-4 min to >10-15 min without LBP to improve participation in household chores and when caring for her children.  -MATEUSZ     LTG 3 Progress New  -MATEUSZ     LTG 4 Pt will improve B hip strength to at least 4-/5.  -MATEUSZ     LTG 4 Progress New  -MATEUSZ     LTG 5 Patient will reduce level of percieved disability as measured by the Modified Oswestry from 84% disability to </= 70% disability in order to improve quality of life.  -     LTG 5 Progress New  [facility]        Time Calculation    PT Goal Re-Cert Due Date 07/12/23  -           User Key  (r) = Recorded By, (t) = Taken By, (c) = Cosigned By    Initials Name Provider Type    Reshma Coburn, PT Physical Therapist                 PT Assessment/Plan     Row Name 04/13/23 1300          PT Assessment    Functional Limitations Impaired gait;Limitation in home management;Limitations in community activities;Limitations in functional capacity and performance;Performance in leisure activities;Performance in self-care ADL  -     Impairments Endurance;Gait;Impaired flexibility;Impaired postural alignment;Impaired muscle length;Impaired muscle power;Impaired muscle endurance;Joint mobility;Motor function;Muscle strength;Pain;Poor body mechanics;Posture;Range of motion  -     Assessment Comments Ivonne Ingram is a 32 y.o. female referred to physical therapy for low back pain during pregnancy. She is now 22 weeks pregnant and reports that in her last two pregnancies, she went to PT and experienced improvement in symtpoms. She presents with a  stable clinical presentation, along with  comorbidities of increased BMI and depression and personal factors of anxiety and history of LBP in previous pregnancies that may impact her progress in the plan of care. Pt presents today with TLJ segmental hinging, increase lumbar lordosis, decreased lumbar ROM, TTP over B posterolateral hip girdles with symptom referral into R lateral thigh, BLE weakness (greatest with R hip) and special test negative for radicular involvement. Her signs and symptoms are consistent with referring diagnosis. The previous impairments limit her ability to maintain static positioning for extended periods of time, perform ADLs (don/doffing shoes) without limitation, care for her children without limitation and walk > 15 minutes without progressive pain. Patients ARDEN outcome measure self score indicates 84% disability, where 0% represents no diasability. Pt will benefit from skilled PT to address the previous impairments and return to PLOF.  -MATEUSZ     Please refer to paper survey for additional self-reported information Yes  -MATEUSZ     Rehab Potential Fair  -MATEUSZ     Patient/caregiver participated in establishment of treatment plan and goals Yes  -MATEUSZ     Patient would benefit from skilled therapy intervention Yes  -MATEUSZ        PT Plan    PT Frequency 2x/week  -MATEUSZ     Predicted Duration of Therapy Intervention (PT) 8-12 visits  -MATEUSZ     Planned CPT's? PT EVAL LOW COMPLEXITY: 16882;PT RE-EVAL: 50935;PT THER PROC EA 15 MIN: 31708;PT THER ACT EA 15 MIN: 24253;PT MANUAL THERAPY EA 15 MIN: 89324;PT NEUROMUSC RE-EDUCATION EA 15 MIN: 86387;PT GAIT TRAINING EA 15 MIN: 36555;PT SELF CARE/HOME MGMT/TRAIN EA 15: 94179;PT HOT OR COLD PACK TREAT MCARE  -MATEUSZ     PT Plan Comments response to initial evaluation, warm up on nustep, consider gentle glute set, HL hip abd/add, gentle STM to R posteriolateral hip girdle, STS, supine PPT  -MATEUSZ           User Key  (r) = Recorded By, (t) = Taken By, (c) = Cosigned By    Initials  Name Provider Type    Reshma Coburn, PT Physical Therapist                   OP Exercises     Row Name 04/13/23 1300             Subjective Comments    Subjective Comments eval  -MATEUSZ         Total Minutes    17188 - PT Therapeutic Exercise Minutes 15  -MATEUSZ         Exercise 1    Exercise Name 1 Nustep  -MATEUSZ      Additional Comments next visit  -MATEUSZ         Exercise 2    Exercise Name 2 LTR  -MATEUSZ      Cueing 2 Verbal;Tactile  -MATEUSZ      Sets 2 1  -MATEUSZ      Reps 2 10  -MATEUSZ      Time 2 5s  -MATEUSZ         Exercise 3    Exercise Name 3 supine piriformis stretch  -MATEUSZ      Cueing 3 Verbal;Tactile  -MATEUSZ      Reps 3 2  -MATEUSZ      Time 3 15  -MATEUSZ      Additional Comments unable to tolerate more due to pain  -MATEUSZ            User Key  (r) = Recorded By, (t) = Taken By, (c) = Cosigned By    Initials Name Provider Type    Reshma Coburn, PT Physical Therapist                              Outcome Measure Options: Modified Oswestry  Modified Oswestry  Modified Oswestry Score/Comments: 84%      Time Calculation:     Start Time: 1400  Stop Time: 1447  Time Calculation (min): 47 min  Timed Charges  33376 - PT Therapeutic Exercise Minutes: 15  94098 - PT Self Care/Mgmt Minutes: 5  Untimed Charges  PT Eval/Re-eval Minutes: 27  Total Minutes  Timed Charges Total Minutes: 20  Untimed Charges Total Minutes: 27   Total Minutes: 47     Therapy Charges for Today     Code Description Service Date Service Provider Modifiers Qty    07850136244 HC PT THER PROC EA 15 MIN 4/13/2023 Reshma Harper, PT GP 1    22850586005 HC PT EVAL LOW COMPLEXITY 2 4/13/2023 Reshma Harper, PT GP 1          PT G-Codes  Outcome Measure Options: Modified Oswestry  Modified Oswestry Score/Comments: 84%         Reshma Harper PT  4/13/2023

## 2023-04-13 NOTE — PROGRESS NOTES
Chief Complaint   Patient presents with   • Routine Prenatal Visit      Ivonne Ingram is a 32 y.o.  at 21w5d who presents for routine prenatal visit. She reports doing okay but has been experiencing increased congestion and running nose, thinking she is having allergies. Denies fever or chills. Denies vaginal bleeding, cramping, contractions, LOF. She has started feeling fetal movement.     /80   Wt (!) 137 kg (301 lb)   LMP  (LMP Unknown)   BMI 51.64 kg/m²    Gen: well appearing, NAD   Abd: gravid, nontender  See OB Flowsheet    ASSESSMENT:   1. IUP at 21w5d   2. Prenatal care in second trimester  3. Morbid obesity- BMI 51- on ASA 81 mg daily   4. Asthma affecting pregnancy- Rescue Albuterol inhaler PRN  5. Fatty liver disease with elevated LFTs-  Last LFTs 23 AST 67, ALT 60   6. Hypothyroidism affecting pregnancy- prescribed Synthroid 25 mcg daily  7. Anxiety- on no medications   8. Seasonal allergies     PLAN:  Problem list reviewed and updated.   Reviewed expectations of this stage of pregnancy.   Second trimester precautions reviewed including  labor precautions, anticipated fetal movements.   Prescribed Claritin 10 mg daily for management of seasonal allergies.   Discussed ultrasound results that demonstrated incomplete fetal anatomy survey- limited views of fetal heart, brain and face and normal cervical length screening. Will plan to repeat follow up anatomy survey in 4 weeks and continue growth ultrasounds every 4 weeks given morbid obesity- BMI 51 and hypothyroidism.   Ordered Free T4, TSH to evaluate hypothyroidism and hepatic function panel to evaluate fatty liver disease with elevated LFTs.   Follow up in 4 weeks for prenatal visit and follow up anatomy survey.     Patient Active Problem List    Diagnosis Date Noted   • Pregnancy 2023   • Morbid obesity with BMI of 50.0-59.9, adult 2023   • Asthma 2023   • Hypothyroidism (acquired) 2023   • Fatty  liver 02/03/2023       Orders Placed This Encounter   Procedures   • TSH     Standing Status:   Future     Number of Occurrences:   1     Standing Expiration Date:   4/13/2024     Order Specific Question:   Release to patient     Answer:   Routine Release   • T4, Free     Standing Status:   Future     Number of Occurrences:   1     Standing Expiration Date:   4/13/2024     Order Specific Question:   Release to patient     Answer:   Routine Release   • Hepatic Function Panel     Standing Status:   Future     Standing Expiration Date:   5/7/2024     Order Specific Question:   Release to patient     Answer:   Routine Release   • POC Urinalysis Dipstick     This is an external result entered through the Results Console.     Order Specific Question:   Release to patient     Answer:   Routine Release     Zeinab Blue MD

## 2023-05-03 ENCOUNTER — ROUTINE PRENATAL (OUTPATIENT)
Dept: OBSTETRICS AND GYNECOLOGY | Facility: CLINIC | Age: 33
End: 2023-05-03
Payer: COMMERCIAL

## 2023-05-03 VITALS — SYSTOLIC BLOOD PRESSURE: 115 MMHG | BODY MASS INDEX: 51.3 KG/M2 | DIASTOLIC BLOOD PRESSURE: 79 MMHG | WEIGHT: 293 LBS

## 2023-05-03 DIAGNOSIS — N89.8 VAGINAL DISCHARGE DURING PREGNANCY IN SECOND TRIMESTER: ICD-10-CM

## 2023-05-03 DIAGNOSIS — B37.2 SKIN YEAST INFECTION: ICD-10-CM

## 2023-05-03 DIAGNOSIS — R35.0 URINARY FREQUENCY: ICD-10-CM

## 2023-05-03 DIAGNOSIS — O35.9XX0 SUSPECTED FETAL ANOMALY, ANTEPARTUM, SINGLE OR UNSPECIFIED FETUS: ICD-10-CM

## 2023-05-03 DIAGNOSIS — E66.01 MORBID OBESITY WITH BMI OF 45.0-49.9, ADULT: ICD-10-CM

## 2023-05-03 DIAGNOSIS — R10.2 PELVIC PRESSURE IN FEMALE: ICD-10-CM

## 2023-05-03 DIAGNOSIS — O26.892 VAGINAL DISCHARGE DURING PREGNANCY IN SECOND TRIMESTER: ICD-10-CM

## 2023-05-03 DIAGNOSIS — Z3A.24 24 WEEKS GESTATION OF PREGNANCY: Primary | ICD-10-CM

## 2023-05-03 LAB
GLUCOSE UR STRIP-MCNC: NEGATIVE MG/DL
PROT UR STRIP-MCNC: NEGATIVE MG/DL

## 2023-05-03 NOTE — PROGRESS NOTES
Chief Complaint   Patient presents with   • Pregnancy Problem     Pressure and frequent urination      Ivonne Ingram is a 32 y.o.  at 24w4d who presents for urgent prenatal visit for vaginal pressure and frequent urination. She reports 3-4 days history of urinary frequency without dysuria, fever, chills, or urinary urgency. She had an episode yesterday where she just barely got to the toilet and experienced a squirt of large amount of clear fluid. She also complains of vaginal pressure and states it feels like the feet of the baby is in her vagina with pressure around the introitus of the vagina. She has accompanying vaginal itching and cottage cheese like discharge on her underwear. Denies vaginal bleeding or contractions. She reports feeling fetal movement but that previous her baby had not been moving for 3 days but is now normal.  Patient asked if her follow up ultrasound could be performed today after the visit that she is suppose to have next week.     /79   Wt 136 kg (299 lb)   LMP  (LMP Unknown)   BMI 51.30 kg/m²    Gen: well appearing, NAD   Abd: gravid, nontender  Pelvis: Inguinal folds mildly erythematous, normal external female genitalia, normal vulva, vaginal mucosa appears slightly erythematous with thick white discharge coating vaginal surface, normal appearing cervix that is visually closed, no pooling in the vagina. SVE: 0/0/-3. Negative nitrazine and ferning on microscopy.   See OB Flowsheet    UA - neg protein, neg glucose, positive hematuria     ASSESSMENT:   1. IUP at 24w4d   2. Urinary frequency   3. Vaginal pressure - no evidence of  labor or PPROM   4. Vaginal discharge   5. Malpositioned heart of fetus on ultrasound   6. Yeast infection of skin     PLAN:  Problem list reviewed and updated.   Reviewed expectations of this stage of pregnancy.   Second trimester precautions reviewed including  labor precautions, anticipated fetal movements.   UA today and  symptoms suggest urinary tract infection and have sent urine culture. Prescribed Macrobid 100 mg BID x 7 days PO and will adjust antibiotic based on urine culture results.  Collected NuSwab BV/Candida to evaluate discharge present but it appears to be consistent with yeast infection. Prescribed Miconazole 2% vaginal cream to be inserted daily for 7 days and Ketoconazole 2% cream to be used externally daily for 7 days on inguinal folds for suspected yeast infection of the skin.   I suspect that her vaginal pressure is related to UTI and vaginitis as there is no evidence of  labor or PPROM based on exam and normal fluid level on ultrasound today.   Follow up ultrasound performed today given previous limited views of fetal brain, heart and face on last anatomy survey. The ultrasound noted malpositioned heart on right side of the chest with normal appearing face and brain of the fetus. I discussed these findings with the patient in the ultrasound room and scheduled her to follow up with M next week for further evaluation and confirmation of this finding. Explained that further workup would need to be performed to determine why the heart is malpositioned but I discussed it could be situs inversus (though less likely because the heart is in a its normal axis), CCAM, congenital diaphragmatic hernia, etc. All questions and concerns answered with the patient.   Follow up in 4 weeks for prenatal visit and 1h GTT.       Patient Active Problem List    Diagnosis Date Noted   • Pregnancy 2023   • Morbid obesity with BMI of 50.0-59.9, adult 2023   • Asthma 2023   • Hypothyroidism (acquired) 2023   • Fatty liver 2023       Orders Placed This Encounter   Procedures   • POC Urinalysis Dipstick     This is an external result entered through the Results Console.     Order Specific Question:   Release to patient     Answer:   Routine Release     Zeinab Blue MD

## 2023-05-04 RX ORDER — KETOCONAZOLE 20 MG/G
1 CREAM TOPICAL DAILY
Qty: 7 G | Refills: 0 | Status: SHIPPED | OUTPATIENT
Start: 2023-05-04 | End: 2023-05-11

## 2023-05-04 RX ORDER — NITROFURANTOIN 25; 75 MG/1; MG/1
100 CAPSULE ORAL 2 TIMES DAILY
Qty: 14 CAPSULE | Refills: 0 | Status: SHIPPED | OUTPATIENT
Start: 2023-05-04 | End: 2023-05-11

## 2023-05-05 ENCOUNTER — REFERRAL TRIAGE (OUTPATIENT)
Dept: LABOR AND DELIVERY | Facility: HOSPITAL | Age: 33
End: 2023-05-05
Payer: COMMERCIAL

## 2023-05-05 LAB
BACTERIA UR CULT: ABNORMAL
BACTERIA UR CULT: ABNORMAL

## 2023-05-07 LAB
A VAGINAE DNA VAG QL NAA+PROBE: ABNORMAL SCORE
BVAB2 DNA VAG QL NAA+PROBE: ABNORMAL SCORE
C ALBICANS DNA VAG QL NAA+PROBE: POSITIVE
C GLABRATA DNA VAG QL NAA+PROBE: POSITIVE
MEGA1 DNA VAG QL NAA+PROBE: ABNORMAL SCORE

## 2023-05-07 NOTE — PROGRESS NOTES
MATERNAL FETAL MEDICINE Consult Note    Dear Dr Zeinab Blue MD:    Thank you for your kind referral of Ivonne Ingram.  As you know, she is a 32 y.o.   at  25 4/7 weeks gestation (Estimated Date of Delivery: 23). This is a consult.      Her antepartum course is complicated by:  Abnormal heart position    Aneuploidy Screening: low risk cell free DNA    HPI: Today, she denies headache, blurry vision, RUQ pain. No vaginal bleeding, no contractions.     Review of History:  Past Medical History:   Diagnosis Date   • Asthma      Past Surgical History:   Procedure Laterality Date   • BREAST IMPLANT SURGERY     • LIPECTOMY         Social History     Socioeconomic History   • Marital status:    Tobacco Use   • Smoking status: Never     Passive exposure: Never   Vaping Use   • Vaping Use: Never used   Substance and Sexual Activity   • Alcohol use: No   • Drug use: No   • Sexual activity: Yes     Family History   Problem Relation Age of Onset   • Hypertension Mother    • Breast cancer Paternal Aunt       Allergies   Allergen Reactions   • Augmentin [Amoxicillin-Pot Clavulanate] Nausea And Vomiting      Current Outpatient Medications on File Prior to Visit   Medication Sig Dispense Refill   • albuterol (PROVENTIL HFA;VENTOLIN HFA) 108 (90 BASE) MCG/ACT inhaler Inhale 2 puffs Every 4 (Four) Hours As Needed for Wheezing.     • aspirin 81 MG EC tablet Take 1 tablet by mouth Daily. 60 tablet 3   • cephalexin (Keflex) 500 MG capsule Take 1 capsule by mouth 4 (Four) Times a Day for 7 days. 28 capsule 0   • fluticasone (FLONASE) 50 MCG/ACT nasal spray      • loratadine (Claritin) 10 MG tablet Take 1 tablet by mouth Daily. 90 tablet 3   • miconazole (MICOTIN) 2 % vaginal cream Insert 1 applicator into the vagina Every Night for 7 days. 7 g 0   • triamcinolone (KENALOG) 0.5 % cream Apply 1 application topically to the appropriate area as directed 2 (Two) Times a Day. 454 g 0   • CVS D3 125 MCG (5000 UT) capsule  "capsule Take 1 capsule by mouth Daily. (Patient not taking: Reported on 5/10/2023)     • cyclobenzaprine (FLEXERIL) 10 MG tablet Take 1 tablet by mouth Every 8 (Eight) Hours As Needed for Muscle Spasms. (Patient not taking: Reported on 5/10/2023) 30 tablet 1   • ferrous sulfate 325 (65 FE) MG tablet Take 1 tablet by mouth Daily. (Patient not taking: Reported on 5/10/2023)     • ketoconazole (NIZORAL) 2 % cream Apply 1 application topically to the appropriate area as directed Daily for 7 days. (Patient not taking: Reported on 5/10/2023) 7 g 0   • levothyroxine (Synthroid) 25 MCG tablet Take 1 tablet by mouth Every Morning. (Patient not taking: Reported on 5/10/2023) 30 tablet 1   • nitrofurantoin, macrocrystal-monohydrate, (Macrobid) 100 MG capsule Take 1 capsule by mouth 2 (Two) Times a Day for 7 days. (Patient not taking: Reported on 5/10/2023) 14 capsule 0   • sertraline (Zoloft) 25 MG tablet Take 1 tablet by mouth Daily. (Patient not taking: Reported on 5/10/2023) 30 tablet 1     No current facility-administered medications on file prior to visit.        Past obstetric, gynecological, medical, surgical, family and social history reviewed.  Relevant lab work and imaging reviewed.    Review of systems  Constitutional:  denies fever, chills, malaise.   ENT/Mouth:  denies sore throat, tinnitis  Eyes: denies vision changes/pain  CV:  denies chest pain  Respiratory:  denies cough/SOB  GI:  denies N/V, diarrhea, abdominal pain.    :   denies dysuria  Skin:  denies lesions or pruritis   Neuro:  denies weakness, focal neurologic symptoms    Vitals:    05/10/23 0949   BP: 116/80   BP Location: Right arm   Patient Position: Sitting   Pulse: 91   Resp: 16   Temp: 98.7 °F (37.1 °C)   TempSrc: Temporal   Weight: 136 kg (299 lb 9.6 oz)   Height: 167.6 cm (66\")       PHYSICAL EXAM   GENERAL: Not in acute distress, AAOx3, pleasant  CARDIO: regular rate and rhythm  PULM: symmetric chest rise, speaking in complete sentences " without difficulty  NEURO: awake, alert and oriented to person, place, and time  ABDOMINAL: No fundal tenderness, no rebound or guarding, gravid  EXTREMITIES: no bilateral lower extremity edema/tenderness  SKIN: Warm, well-perfused      ULTRASOUND   Please view full ultrasound note on Imaging tab in ViewPoint.  Cephalic presentation  Anterior placenta.  EZEQUIEL 18 cm, which is normal.   g (34%, AC 12%)  Limited face and some heart views.  Heart appears to be pushed towards right chest in some views but normal axis and apparently intact diaphragm.     ASSESSMENT/COUNSELIN y.o.   at  25 4/7 weeks gestation (Estimated Date of Delivery: 23).     -Pregnancy  [ X ] stable  [   ] improving [  ] worsening    Diagnoses and all orders for this visit:    1. Abnormal US (ultrasound) of abdomen (Primary)  -     US [facility]; Future  -     External Facility Surgical/Procedural Request; Future    2. Suspected fetal anomaly, antepartum, single or unspecified fetus       Heart appears to be pushed towards right chest in some views but normal axis and apparently intact diaphragm.    Discussed differential with patient including small CDH, CPAM/mass and normal variation.  The heart appears normal although with some suboptimal views and the axis is normal.  However, in some views, the heart appears to be displaced.  Sometimes a small CDH can cause this, although the diaphragm appears intact on multiple views.      I have seen where a small CDH presents itself later one and just a small loop of bowel is up.  The stomach is down in images.  We discussed a few ways to further evaluate this.  1) We could do an MRI to evaluate integrity of the diaphragm/ look for CDH/ other mass.    2) We could do another US and continue to follow closely and evaluate throughout pregnancy.     She would like to come back in 3-4 weeks but also wants to try to do the MRI. She said she is nervous she will be  claustrophobic--we will ask about an open MRI, but I do not think this will be possible.  If not. We will send her in ativan so she can try to complete this.       Summary of Plan  -Repeat growth/anatomy 3 weeks  -Scheduled for MRI, will send in ativan if necessary    Follow-up: 3 weeks, will schedule MRI    Thank you for the consult and opportunity to care for this patient.  Please feel free to reach out with any questions or concerns.      I spent 20 minutes caring for this patient on this date of service. This time includes time spent by me in the following activities: preparing for the visit, reviewing tests, obtaining and/or reviewing a separately obtained history, performing a medically appropriate examination and/or evaluation, counseling and educating the patient/family/caregiver and independently interpreting results and communicating that information with the patient/family/caregiver with greater than 50% spent in counseling and coordination of care.       I spent 5 minutes on the separately reported service of US imaging not included in the time used to support the E/M service also reported today.      Wilma Hough MD FACOG  Maternal Fetal Medicine-Psychiatric  Office: [phone]  [email or web address]

## 2023-05-08 ENCOUNTER — TELEPHONE (OUTPATIENT)
Dept: OBSTETRICS AND GYNECOLOGY | Facility: CLINIC | Age: 33
End: 2023-05-08
Payer: COMMERCIAL

## 2023-05-08 ENCOUNTER — PATIENT OUTREACH (OUTPATIENT)
Dept: LABOR AND DELIVERY | Facility: HOSPITAL | Age: 33
End: 2023-05-08
Payer: COMMERCIAL

## 2023-05-08 DIAGNOSIS — B37.2 SKIN YEAST INFECTION: ICD-10-CM

## 2023-05-08 DIAGNOSIS — O23.40 GROUP B STREPTOCOCCUS URINARY TRACT INFECTION AFFECTING PREGNANCY, ANTEPARTUM: Primary | ICD-10-CM

## 2023-05-08 DIAGNOSIS — B95.1 GROUP B STREPTOCOCCUS URINARY TRACT INFECTION AFFECTING PREGNANCY, ANTEPARTUM: Primary | ICD-10-CM

## 2023-05-08 RX ORDER — CEPHALEXIN 500 MG/1
500 CAPSULE ORAL 4 TIMES DAILY
Qty: 28 CAPSULE | Refills: 0 | Status: SHIPPED | OUTPATIENT
Start: 2023-05-08 | End: 2023-05-15

## 2023-05-08 NOTE — OUTREACH NOTE
[facility]  Enrollment    Current Estimated Gestational Age: 25w2d    Questions/Answers    Flowsheet Row Responses   Would like to participate? Yes   Date of Intake Visit 05/08/23        [facility]  Intake    Current Estimated Gestational Age: 25w2d    Intake Assessment    Flowsheet Row Responses   Best Method for Contacting Cell   Currently Employed No   Able to keep appointments as scheduled Yes   Gender(s) and Name(s) boy   Do you have a dentist? Yes   Have you seen a dentist in the last 6 months No   Resources Presently Utilizing: WIC (Women, Infant, Children)   Maternal Warning Signs Provided   Other Education Insurance benefits/Incentives, WIC Benefits, HANDS          Learning Assessment    Flowsheet Row Responses   Relationship Patient   Learner Name Ivonne   Does the learner have any barriers to learning? No Barriers   What is the preferred language of the learner for medical teaching? English   Is an  required? No   How does the learner prefer to learn new concepts? Listening, Reading, Demonstration, Pictures/Video        [facility]  Check-In    Current Estimated Gestational Age: 25w2d    Questions/Answers    Flowsheet Row Responses   Best Method for Contacting Cell   Demographics Reviewed Yes   Currently Employed No   Able to keep appointments as scheduled Yes   Gender(s) and Name(s) boy   Baby Active/Feeling Fetal Movemen Yes   How are you presently feeling? currently dealing with UTI   Questions regarding prenatal visits or tests to be ordered? No   May I ask you questions about your substance use? Yes   Other Comment denies   Supplies ready for baby Clothing   Resource/Environmental Concerns None   Do you have any questions related to your care experience, your pregnancy, plans for delivery, any concerns, etc? Yes   Question questions about UTI, see note   Other Education Insurance benefits/Incentives, WIC Benefits, HANDS        Enrollment completed today. Pt  lives in a mobile home with her  and children and reports reliable transportation. She is anxious about her upcoming appt with [facility]. They do not have many infant supplies yet, encouraged to call [employer] for car seat and sent flyer for maternity pantry. Reviewed how to order breastpump. During delivery her  may need to be home with the other kids so info sent about free  services. She is currently being treated for a UTI but feels that is it getting worse. Encouraged to call OB office this afternoon about this. Will f/u in one month, sooner if she is unable to get into My Chart and I will bring folder to appt.     Rosio Galarza RN  Maternity Nurse Navigator    5/8/2023, 14:43 EDT

## 2023-05-08 NOTE — TELEPHONE ENCOUNTER
OB patient states that she is till currently taking Macrobid but is not having any relief. Patient states she is still constantly having the urge to pee and that when she does go nothing comes out.  Patient is wanting to know if she can have something else sent in for UTI.    Patient was not able to get medication ketoconazole (NIZORAL) due to it needing a PA.      Please advise,  Thank you

## 2023-05-10 ENCOUNTER — OFFICE VISIT (OUTPATIENT)
Dept: OBSTETRICS AND GYNECOLOGY | Facility: CLINIC | Age: 33
End: 2023-05-10
Payer: COMMERCIAL

## 2023-05-10 ENCOUNTER — HOSPITAL ENCOUNTER (OUTPATIENT)
Dept: ULTRASOUND IMAGING | Facility: HOSPITAL | Age: 33
Discharge: HOME | End: 2023-05-10
Admitting: STUDENT IN AN ORGANIZED HEALTH CARE EDUCATION/TRAINING PROGRAM
Payer: COMMERCIAL

## 2023-05-10 VITALS
HEART RATE: 91 BPM | DIASTOLIC BLOOD PRESSURE: 80 MMHG | HEIGHT: 66 IN | TEMPERATURE: 98.7 F | WEIGHT: 293 LBS | RESPIRATION RATE: 16 BRPM | BODY MASS INDEX: 47.09 KG/M2 | SYSTOLIC BLOOD PRESSURE: 116 MMHG

## 2023-05-10 DIAGNOSIS — O35.9XX0 SUSPECTED FETAL ANOMALY, ANTEPARTUM, SINGLE OR UNSPECIFIED FETUS: ICD-10-CM

## 2023-05-10 DIAGNOSIS — R93.5 ABNORMAL US (ULTRASOUND) OF ABDOMEN: Primary | ICD-10-CM

## 2023-05-10 PROCEDURE — 76811 OB US DETAILED SNGL FETUS: CPT

## 2023-05-10 RX ORDER — LORAZEPAM 1 MG/1
1 TABLET ORAL EVERY 8 HOURS PRN
Qty: 3 TABLET | Refills: 0 | Status: SHIPPED | OUTPATIENT
Start: 2023-05-10

## 2023-05-10 NOTE — PROGRESS NOTES
Pt reports that she is doing well and denies vaginal bleeding, cramping, contractions or LOF at this time. Reports active fetal movement. Reviewed when to call OB office or present to L&D for evaluation with symptoms such as decreased fetal movement, vaginal bleeding, LOF or ctxs. Pt verbalized understanding. Denies HA, visual changes or epigastric pain. Denies any additional complaints at time of appointment. Next OB appointment scheduled for 05/11    Vitals:    05/10/23 0949   BP: 116/80   Pulse: 91   Resp: 16   Temp: 98.7 °F (37.1 °C)

## 2023-05-10 NOTE — LETTER
May 10, 2023     Zeinab Blue MD  [address]  [address]  [facility] [address]    Patient: Ivonne Ingram   YOB: 1990   Date of Visit: 5/10/2023       Dear Zeinab Blue MD,    Thank you for referring Ivonne Ingram to me for evaluation. Below is a copy of my consult note.    If you have questions, please do not hesitate to call me. I look forward to following Ivonne along with you.         Sincerely,        Wilma Hough MD    MATERNAL FETAL MEDICINE Consult Note    Dear Dr Zeinab Blue MD:    Thank you for your kind referral of Ivonne Ingram.  As you know, she is a 32 y.o.   at  25 4/7 weeks gestation (Estimated Date of Delivery: 23). This is a consult.      Her antepartum course is complicated by:  Abnormal heart position    Aneuploidy Screening: low risk cell free DNA    HPI: Today, she denies headache, blurry vision, RUQ pain. No vaginal bleeding, no contractions.     Review of History:  Past Medical History:   Diagnosis Date   • Asthma      Past Surgical History:   Procedure Laterality Date   • BREAST IMPLANT SURGERY     • LIPECTOMY         Social History     Socioeconomic History   • Marital status:    Tobacco Use   • Smoking status: Never     Passive exposure: Never   Vaping Use   • Vaping Use: Never used   Substance and Sexual Activity   • Alcohol use: No   • Drug use: No   • Sexual activity: Yes     Family History   Problem Relation Age of Onset   • Hypertension Mother    • Breast cancer Paternal Aunt       Allergies   Allergen Reactions   • Augmentin [Amoxicillin-Pot Clavulanate] Nausea And Vomiting      Current Outpatient Medications on File Prior to Visit   Medication Sig Dispense Refill   • albuterol (PROVENTIL HFA;VENTOLIN HFA) 108 (90 BASE) MCG/ACT inhaler Inhale 2 puffs Every 4 (Four) Hours As Needed for Wheezing.     • aspirin 81 MG EC tablet Take 1 tablet by mouth Daily. 60 tablet 3   • cephalexin (Keflex) 500 MG capsule Take 1 capsule by mouth 4  (Four) Times a Day for 7 days. 28 capsule 0   • fluticasone (FLONASE) 50 MCG/ACT nasal spray      • loratadine (Claritin) 10 MG tablet Take 1 tablet by mouth Daily. 90 tablet 3   • miconazole (MICOTIN) 2 % vaginal cream Insert 1 applicator into the vagina Every Night for 7 days. 7 g 0   • triamcinolone (KENALOG) 0.5 % cream Apply 1 application topically to the appropriate area as directed 2 (Two) Times a Day. 454 g 0   • CVS D3 125 MCG (5000 UT) capsule capsule Take 1 capsule by mouth Daily. (Patient not taking: Reported on 5/10/2023)     • cyclobenzaprine (FLEXERIL) 10 MG tablet Take 1 tablet by mouth Every 8 (Eight) Hours As Needed for Muscle Spasms. (Patient not taking: Reported on 5/10/2023) 30 tablet 1   • ferrous sulfate 325 (65 FE) MG tablet Take 1 tablet by mouth Daily. (Patient not taking: Reported on 5/10/2023)     • ketoconazole (NIZORAL) 2 % cream Apply 1 application topically to the appropriate area as directed Daily for 7 days. (Patient not taking: Reported on 5/10/2023) 7 g 0   • levothyroxine (Synthroid) 25 MCG tablet Take 1 tablet by mouth Every Morning. (Patient not taking: Reported on 5/10/2023) 30 tablet 1   • nitrofurantoin, macrocrystal-monohydrate, (Macrobid) 100 MG capsule Take 1 capsule by mouth 2 (Two) Times a Day for 7 days. (Patient not taking: Reported on 5/10/2023) 14 capsule 0   • sertraline (Zoloft) 25 MG tablet Take 1 tablet by mouth Daily. (Patient not taking: Reported on 5/10/2023) 30 tablet 1     No current facility-administered medications on file prior to visit.        Past obstetric, gynecological, medical, surgical, family and social history reviewed.  Relevant lab work and imaging reviewed.    Review of systems  Constitutional:  denies fever, chills, malaise.   ENT/Mouth:  denies sore throat, tinnitis  Eyes: denies vision changes/pain  CV:  denies chest pain  Respiratory:  denies cough/SOB  GI:  denies N/V, diarrhea, abdominal pain.    :   denies dysuria  Skin:  denies  "lesions or pruritis   Neuro:  denies weakness, focal neurologic symptoms    Vitals:    05/10/23 0949   BP: 116/80   BP Location: Right arm   Patient Position: Sitting   Pulse: 91   Resp: 16   Temp: 98.7 °F (37.1 °C)   TempSrc: Temporal   Weight: 136 kg (299 lb 9.6 oz)   Height: 167.6 cm (66\")       PHYSICAL EXAM   GENERAL: Not in acute distress, AAOx3, pleasant  CARDIO: regular rate and rhythm  PULM: symmetric chest rise, speaking in complete sentences without difficulty  NEURO: awake, alert and oriented to person, place, and time  ABDOMINAL: No fundal tenderness, no rebound or guarding, gravid  EXTREMITIES: no bilateral lower extremity edema/tenderness  SKIN: Warm, well-perfused      ULTRASOUND   Please view full ultrasound note on Imaging tab in ViewPoint.  Cephalic presentation  Anterior placenta.  EZEQUIEL 18 cm, which is normal.   g (34%, AC 12%)  Limited face and some heart views.  Heart appears to be pushed towards right chest in some views but normal axis and apparently intact diaphragm.     ASSESSMENT/COUNSELIN y.o.   at  25 4/7 weeks gestation (Estimated Date of Delivery: 23).     -Pregnancy  [ X ] stable  [   ] improving [  ] worsening    Diagnoses and all orders for this visit:    1. Abnormal US (ultrasound) of abdomen (Primary)  -     US [facility]; Future  -     External Facility Surgical/Procedural Request; Future    2. Suspected fetal anomaly, antepartum, single or unspecified fetus       Heart appears to be pushed towards right chest in some views but normal axis and apparently intact diaphragm.    Discussed differential with patient including small CDH, CPAM/mass and normal variation.  The heart appears normal although with some suboptimal views and the axis is normal.  However, in some views, the heart appears to be displaced.  Sometimes a small CDH can cause this, although the diaphragm appears intact on multiple views.      I have seen where a small CDH " presents itself later one and just a small loop of bowel is up.  The stomach is down in images.  We discussed a few ways to further evaluate this.  1) We could do an MRI to evaluate integrity of the diaphragm/ look for CDH/ other mass.    2) We could do another US and continue to follow closely and evaluate throughout pregnancy.     She would like to come back in 3-4 weeks but also wants to try to do the MRI. She said she is nervous she will be claustrophobic--we will ask about an open MRI, but I do not think this will be possible.  If not. We will send her in ativan so she can try to complete this.       Summary of Plan  -Repeat growth/anatomy 3 weeks  -Scheduled for MRI, will send in ativan if necessary    Follow-up: 3 weeks, will schedule MRI    Thank you for the consult and opportunity to care for this patient.  Please feel free to reach out with any questions or concerns.      I spent 20 minutes caring for this patient on this date of service. This time includes time spent by me in the following activities: preparing for the visit, reviewing tests, obtaining and/or reviewing a separately obtained history, performing a medically appropriate examination and/or evaluation, counseling and educating the patient/family/caregiver and independently interpreting results and communicating that information with the patient/family/caregiver with greater than 50% spent in counseling and coordination of care.       I spent 5 minutes on the separately reported service of US imaging not included in the time used to support the E/M service also reported today.      Wilma Hough MD FAC  Maternal Fetal Medicine-[facility]  Office: [phone]  [email or web address]

## 2023-05-15 ENCOUNTER — TELEPHONE (OUTPATIENT)
Dept: ULTRASOUND IMAGING | Facility: HOSPITAL | Age: 33
End: 2023-05-15
Payer: COMMERCIAL

## 2023-05-15 ENCOUNTER — TELEPHONE (OUTPATIENT)
Dept: OBSTETRICS AND GYNECOLOGY | Facility: CLINIC | Age: 33
End: 2023-05-15
Payer: COMMERCIAL

## 2023-05-15 DIAGNOSIS — O35.9XX0 SUSPECTED FETAL ANOMALY, ANTEPARTUM, SINGLE OR UNSPECIFIED FETUS: ICD-10-CM

## 2023-05-15 DIAGNOSIS — R93.5 ABNORMAL US (ULTRASOUND) OF ABDOMEN: ICD-10-CM

## 2023-05-15 RX ORDER — LORAZEPAM 1 MG/1
1 TABLET ORAL EVERY 8 HOURS PRN
Qty: 1 TABLET | Refills: 0 | Status: SHIPPED | OUTPATIENT
Start: 2023-05-15

## 2023-05-15 NOTE — TELEPHONE ENCOUNTER
Pt can not find her Ativan that was called in to her for the MRI.  Dr. Hough notified.    Called pt back after speaking to Dr. Hough.   1 Ativan pill called into pharmacy and she is instructed to take it 30 minutes prior to the MRI.  She was advised she will need someone to drive her to appt and back.  Pt voiced understanding.

## 2023-05-16 ENCOUNTER — TELEPHONE (OUTPATIENT)
Dept: ULTRASOUND IMAGING | Facility: HOSPITAL | Age: 33
End: 2023-05-16
Payer: COMMERCIAL

## 2023-05-16 NOTE — TELEPHONE ENCOUNTER
Called pt back to discuss MRI.  She was unable to go through MRI due to anxiety even with the Ativan.    We have rescheduled her for 5/22/23 at [facility] at 5pm for 6pm MRI.  She will take ativan 1 hour prior to MRI and another one 30 minutes prior to MRI.  If she can not make it through with these meds she understands we will have to wait till baby is born.  She voiced understanding.

## 2023-05-22 DIAGNOSIS — B37.9 YEAST INFECTION: Primary | ICD-10-CM

## 2023-05-22 RX ORDER — FLUCONAZOLE 150 MG/1
150 TABLET ORAL
Qty: 2 TABLET | Refills: 0 | Status: SHIPPED | OUTPATIENT
Start: 2023-05-22

## 2023-05-23 ENCOUNTER — TELEPHONE (OUTPATIENT)
Dept: ULTRASOUND IMAGING | Facility: HOSPITAL | Age: 33
End: 2023-05-23
Payer: COMMERCIAL

## 2023-05-23 NOTE — TELEPHONE ENCOUNTER
I CALLED CHRISTIN TO SEE IF PT HAD GOTTEN HER FETAL MRI/ ACCORDING TO THE REP PT CANCELLED ON 5/15/23,I R/S HER FOR 5/19/23 PT NO SHOWED/ I R/S HER FOR 5/22/23 PT CANCELLED. I WILL LET THE PROVIDER KNOW.

## 2023-05-25 ENCOUNTER — ROUTINE PRENATAL (OUTPATIENT)
Dept: OBSTETRICS AND GYNECOLOGY | Facility: CLINIC | Age: 33
End: 2023-05-25
Payer: COMMERCIAL

## 2023-05-25 VITALS — WEIGHT: 293 LBS | SYSTOLIC BLOOD PRESSURE: 110 MMHG | BODY MASS INDEX: 48.26 KG/M2 | DIASTOLIC BLOOD PRESSURE: 86 MMHG

## 2023-05-25 DIAGNOSIS — J02.0 STREP THROAT: ICD-10-CM

## 2023-05-25 DIAGNOSIS — O36.80X0 ENCOUNTER TO DETERMINE FETAL VIABILITY OF PREGNANCY, SINGLE OR UNSPECIFIED FETUS: ICD-10-CM

## 2023-05-25 DIAGNOSIS — Z34.83 PRENATAL CARE, SUBSEQUENT PREGNANCY, THIRD TRIMESTER: ICD-10-CM

## 2023-05-25 DIAGNOSIS — O99.280 HYPOTHYROIDISM AFFECTING PREGNANCY, ANTEPARTUM: ICD-10-CM

## 2023-05-25 DIAGNOSIS — O09.899 HISTORY OF GBS (GROUP B STREPTOCOCCUS) UTI, CURRENTLY PREGNANT: ICD-10-CM

## 2023-05-25 DIAGNOSIS — E66.01 MORBID OBESITY WITH BMI OF 45.0-49.9, ADULT: ICD-10-CM

## 2023-05-25 DIAGNOSIS — Z3A.27 27 WEEKS GESTATION OF PREGNANCY: Primary | ICD-10-CM

## 2023-05-25 DIAGNOSIS — Z34.92 NORMAL PREGNANCY, SECOND TRIMESTER: Primary | ICD-10-CM

## 2023-05-25 DIAGNOSIS — E03.9 HYPOTHYROIDISM AFFECTING PREGNANCY, ANTEPARTUM: ICD-10-CM

## 2023-05-25 DIAGNOSIS — Z87.440 HISTORY OF GBS (GROUP B STREPTOCOCCUS) UTI, CURRENTLY PREGNANT: ICD-10-CM

## 2023-05-25 DIAGNOSIS — F41.9 ANXIETY: ICD-10-CM

## 2023-05-25 DIAGNOSIS — O36.8390 UNABLE TO HEAR FETAL HEART TONES AS REASON FOR ULTRASOUND SCAN: ICD-10-CM

## 2023-05-25 DIAGNOSIS — K76.0 FATTY LIVER DISEASE, NONALCOHOLIC: ICD-10-CM

## 2023-05-25 DIAGNOSIS — O35.9XX0 SUSPECTED FETAL ANOMALY, ANTEPARTUM, SINGLE OR UNSPECIFIED FETUS: ICD-10-CM

## 2023-05-25 LAB
GLUCOSE UR STRIP-MCNC: NEGATIVE MG/DL
PROT UR STRIP-MCNC: ABNORMAL MG/DL

## 2023-05-25 RX ORDER — AMOXICILLIN 400 MG/5ML
400 POWDER, FOR SUSPENSION ORAL 2 TIMES DAILY
Qty: 100 ML | Refills: 0 | Status: SHIPPED | OUTPATIENT
Start: 2023-05-25 | End: 2023-06-04

## 2023-05-25 NOTE — PROGRESS NOTES
Chief Complaint   Patient presents with   • Routine Prenatal Visit      Ivonne Ingram is a 32 y.o.  at 27w5d who presents for routine prenatal visit. She reports that both of her kids have been diagnosed with strep throat in the last couple of days and she has started to develop a sore throat. Denies fever or chills. She also reports feeling a sharp pain across the abdomen on the right side at level of umbilicus. She last felt it 3 days ago and it was constant but fluctuated in intensity. It makes it uncomfortable to sleep.  Denies vaginal bleeding, cramping, contractions, LOF. She reports active fetal movement.     /86   Wt 136 kg (299 lb)   LMP  (LMP Unknown)   BMI 48.26 kg/m²    [place]: well appearing, NAD   Abd: gravid, nontender  Unable to hear fetal heart tones with doppler.   See OB Flowsheet    ASSESSMENT:   1. IUP at 27w5d   2. Prenatal care in second trimester  3. Morbid obesity- BMI 51- on ASA 81 mg daily  4. Asthma affecting pregnancy- rescue albuterol inhaler as needed  5. Fatty liver disease with elevated LFTs- last LFTs in 2023- AST 67, ALT 60  6. Hypothyroidism affecting pregnancy- prescribed Synthroid 25 mcg daily    7. Anxiety- on Zoloft 25 mg daily  8. Abnormal position of fetal heart on ultrasound  9. Unable to hear fetal heart tones with doppler   10. Fetal viability   11. History of GBS UTI   12. Suspected strep throat     PLAN:  Problem list reviewed and updated.   Reviewed expectations of this stage of pregnancy.   Second trimester precautions reviewed including  labor precautions, anticipated fetal movements.   Ultrasound performed today for fetal viability after heart tones unable to be heard with doppler and ultrasound noted fetus in vertex presentation with fetal heart rate 161 bpm and fetal heart noted on right side of the chest. EZEQUIEL returned normal at 12.4 cm.   Prescribed Amoxil 400 mg/5 mL suspension for 5 mL to be taken twice a day for 10 days for treatment of  strep throat.   Collected TSH and free T4 today for evaluation of hypothyroidism.  Collected hepatic function panel to evaluate LFTs in setting of fatty liver disease.  Collected 1h GTT today.   Reviewed MFM consultation note from 5/10/23 that will plan for repeat growth and anatomy survey 3 weeks and schedule for MRI to evaluate for possible small CDH given abnormal position of the heart to right side of the chest.    Return in about 2 weeks (around 6/8/2023) for prenatal visit.    Patient Active Problem List    Diagnosis Date Noted   • Pregnancy 02/03/2023   • Morbid obesity with BMI of 50.0-59.9, adult 02/03/2023   • Asthma 02/03/2023   • Hypothyroidism (acquired) 02/03/2023   • Fatty liver 02/03/2023       Orders Placed This Encounter   Procedures   • US Ob Limited 1 + Fetuses     Order Specific Question:   Reason for Exam:     Answer:   fetal viability, unable to hear fetal heart tones     Order Specific Question:   Release to patient     Answer:   Routine Release   • POC Urinalysis Dipstick     Order Specific Question:   Release to patient     Answer:   Routine Release     Zeinab Blue MD

## 2023-05-26 DIAGNOSIS — E03.9 HYPOTHYROIDISM AFFECTING PREGNANCY, ANTEPARTUM: Primary | ICD-10-CM

## 2023-05-26 DIAGNOSIS — O99.280 HYPOTHYROIDISM AFFECTING PREGNANCY, ANTEPARTUM: Primary | ICD-10-CM

## 2023-05-26 DIAGNOSIS — R73.09 ELEVATED GLUCOSE TOLERANCE TEST: Primary | ICD-10-CM

## 2023-05-26 LAB — GLUCOSE 1H P 50 G GLC PO SERPL-MCNC: 136 MG/DL (ref 65–139)

## 2023-05-26 RX ORDER — LEVOTHYROXINE SODIUM 0.07 MG/1
75 TABLET ORAL DAILY
Qty: 60 TABLET | Refills: 1 | Status: SHIPPED | OUTPATIENT
Start: 2023-05-26

## 2023-05-29 NOTE — PROGRESS NOTES
MATERNAL FETAL MEDICINE Consult Note    Dear Dr Zeinab Blue MD:    Thank you for your kind referral of Ivonne Ingram.  As you know, she is a 32 y.o.   at  28 4/7 weeks gestation (Estimated Date of Delivery: 23). This is a consult.      Her antepartum course is complicated by:  Abnormal heart position    Aneuploidy Screening: low risk cell free DNA    HPI: Today, she denies headache, blurry vision, RUQ pain. No vaginal bleeding, no contractions.     Review of History:  Past Medical History:   Diagnosis Date   • Asthma      Past Surgical History:   Procedure Laterality Date   • BREAST IMPLANT SURGERY     • LIPECTOMY         Social History     Socioeconomic History   • Marital status:    Tobacco Use   • Smoking status: Never     Passive exposure: Never   Vaping Use   • Vaping Use: Never used   Substance and Sexual Activity   • Alcohol use: No   • Drug use: No   • Sexual activity: Yes     Family History   Problem Relation Age of Onset   • Hypertension Mother    • Breast cancer Paternal Aunt       Allergies   Allergen Reactions   • Augmentin [Amoxicillin-Pot Clavulanate] Nausea And Vomiting      Current Outpatient Medications on File Prior to Visit   Medication Sig Dispense Refill   • albuterol (PROVENTIL HFA;VENTOLIN HFA) 108 (90 BASE) MCG/ACT inhaler Inhale 2 puffs Every 4 (Four) Hours As Needed for Wheezing.     • amoxicillin (AMOXIL) 400 MG/5ML suspension Take 5 mL by mouth 2 (Two) Times a Day for 10 days. 100 mL 0   • aspirin 81 MG EC tablet Take 1 tablet by mouth Daily. 60 tablet 3   • [employer] D3 125 MCG (5000 UT) capsule capsule Take 1 capsule by mouth Daily.     • cyclobenzaprine (FLEXERIL) 10 MG tablet Take 1 tablet by mouth Every 8 (Eight) Hours As Needed for Muscle Spasms. 30 tablet 1   • ferrous sulfate 325 (65 FE) MG tablet Take 1 tablet by mouth Daily.     • fluconazole (Diflucan) 150 MG tablet Take 1 tablet by mouth Every 3 (Three) Days. 2 tablet 0   • fluticasone (FLONASE) 50 MCG/ACT  "nasal spray      • levothyroxine (Synthroid) 75 MCG tablet Take 1 tablet by mouth Daily. 60 tablet 1   • loratadine (Claritin) 10 MG tablet Take 1 tablet by mouth Daily. 90 tablet 3   • LORazepam (Ativan) 1 MG tablet Take 1 tablet by mouth Every 8 (Eight) Hours As Needed for Anxiety (for MRI). 3 tablet 0   • sertraline (Zoloft) 25 MG tablet Take 1 tablet by mouth Daily. 30 tablet 1   • triamcinolone (KENALOG) 0.5 % cream Apply 1 application topically to the appropriate area as directed 2 (Two) Times a Day. 454 g 0   • LORazepam (Ativan) 1 MG tablet Take 1 tablet by mouth Every 8 (Eight) Hours As Needed for Anxiety (For anxiety for MRI). (Patient not taking: Reported on 5/31/2023) 1 tablet 0     No current facility-administered medications on file prior to visit.        Past obstetric, gynecological, medical, surgical, family and social history reviewed.  Relevant lab work and imaging reviewed.    Review of systems  Constitutional:  denies fever, chills, malaise.   ENT/Mouth:  denies sore throat, tinnitis  Eyes: denies vision changes/pain  CV:  denies chest pain  Respiratory:  denies cough/SOB  GI:  denies N/V, diarrhea, abdominal pain.    :   denies dysuria  Skin:  denies lesions or pruritis   Neuro:  denies weakness, focal neurologic symptoms    Vitals:    05/31/23 0944   BP: 118/75   BP Location: Right arm   Patient Position: Sitting   Pulse: 83   Resp: 16   Temp: 97.9 °F (36.6 °C)   TempSrc: Temporal   Weight: 135 kg (298 lb 9.6 oz)   Height: 162.6 cm (64\")       PHYSICAL EXAM   GENERAL: Not in acute distress, AAOx3, pleasant  CARDIO: regular rate and rhythm  PULM: symmetric chest rise, speaking in complete sentences without difficulty  NEURO: awake, alert and oriented to person, place, and time  ABDOMINAL: No fundal tenderness, no rebound or guarding, gravid  EXTREMITIES: no bilateral lower extremity edema/tenderness  SKIN: Warm, well-perfused      ULTRASOUND   Please view full ultrasound note on Imaging tab " in ViewPoint.  Cephalic presentation.  Anterior placenta.  EZEQUIEL 13 cm, which is normal.   EFW 1199 g (46%, AC 26%)  Heart axis appears shifted to the right with no obvious chest mass and intact-appearing diaphragm.     ASSESSMENT/COUNSELIN y.o.   at  28 4/7 weeks gestation (Estimated Date of Delivery: 23).     -Pregnancy  [ X ] stable  [   ] improving [  ] worsening    Diagnoses and all orders for this visit:    1. Suspected fetal anomaly, antepartum, single or unspecified fetus (Primary)    2. Abnormal fetal heart beat first noted during labor or delivery in liveborn infant  -     [facility]; Future  -     [facility]; Standing  -     [facility]    3. Abnormal US (ultrasound) of abdomen         Heart appears to be pushed towards right chest in some views but normal axis and apparently intact diaphragm.    Discussed differential with patient including small CDH, CPAM/mass and normal variation.  The heart appears normal and the axis is normal.  However, in some views, the heart appears to be displaced.  Sometimes a small CDH can cause this, although the diaphragm appears intact on multiple views.      I have seen where a small CDH presents itself later one and just a small loop of bowel is up.  The stomach is down in images.  She did not tolerate a fetal MRI and does not desire to try this again.  We will continue serial growths to watch for signs of late appearing CDH/other issues.  I'm hopeful this is a normal variant.  To cover our bases, we will also do a fetal to further evaluate--appreciate Dr. Segovia's care.       I discussed in no uncertain terms that US is not perfect and I think it is unlikely but possible that her baby could have an unrecognized issue that could become apparent after delivery.  We will have NICU at least aware so they can evaluate the baby after delivery for any respiratory distress, etc.        Summary of Plan  -Repeat growth 4 weeks  -Fetal ECHO ordered    Follow-up: 4 weeks growth    Thank you for the consult and opportunity to care for this patient.  Please feel free to reach out with any questions or concerns.      I spent 20 minutes caring for this patient on this date of service. This time includes time spent by me in the following activities: preparing for the visit, reviewing tests, obtaining and/or reviewing a separately obtained history, performing a medically appropriate examination and/or evaluation, counseling and educating the patient/family/caregiver and independently interpreting results and communicating that information with the patient/family/caregiver with greater than 50% spent in counseling and coordination of care.       I spent 5 minutes on the separately reported service of US imaging not included in the time used to support the E/M service also reported today.      Wilma Hough MD FACOG  Maternal Fetal Medicine-[facility]  Office: [phone]  [email or web address]

## 2023-05-31 ENCOUNTER — OFFICE VISIT (OUTPATIENT)
Dept: OBSTETRICS AND GYNECOLOGY | Facility: CLINIC | Age: 33
End: 2023-05-31

## 2023-05-31 ENCOUNTER — HOSPITAL ENCOUNTER (OUTPATIENT)
Dept: ULTRASOUND IMAGING | Facility: HOSPITAL | Age: 33
Discharge: HOME | End: 2023-05-31

## 2023-05-31 ENCOUNTER — PATIENT OUTREACH (OUTPATIENT)
Dept: LABOR AND DELIVERY | Facility: HOSPITAL | Age: 33
End: 2023-05-31

## 2023-05-31 VITALS
BODY MASS INDEX: 50.02 KG/M2 | DIASTOLIC BLOOD PRESSURE: 75 MMHG | HEIGHT: 64 IN | RESPIRATION RATE: 16 BRPM | TEMPERATURE: 97.9 F | WEIGHT: 293 LBS | SYSTOLIC BLOOD PRESSURE: 118 MMHG | HEART RATE: 83 BPM

## 2023-05-31 DIAGNOSIS — R93.5 ABNORMAL US (ULTRASOUND) OF ABDOMEN: ICD-10-CM

## 2023-05-31 DIAGNOSIS — O35.9XX0 SUSPECTED FETAL ANOMALY, ANTEPARTUM, SINGLE OR UNSPECIFIED FETUS: Primary | ICD-10-CM

## 2023-05-31 NOTE — PROGRESS NOTES
Pt reports that she is doing well and denies vaginal bleeding, cramping, contractions or LOF at this time. Reports active fetal movement. Reviewed when to call OB office or present to L&D for evaluation with symptoms such as decreased fetal movement, vaginal bleeding, LOF or ctxs. Pt verbalized understanding. Denies HA, visual changes or epigastric pain. Denies any additional complaints at time of appointment. Next OB appointment scheduled for 06/09    Vitals:    05/31/23 0944   BP: 118/75   Pulse: 83   Resp: 16   Temp: 97.9 °F (36.6 °C)

## 2023-05-31 NOTE — OUTREACH NOTE
"[facility]  Check-In    Current Estimated Gestational Age: 28w4d    Questions/Answers    Flowsheet Row Responses   Best Method for Contacting Cell   Demographics Reviewed Yes   Currently Employed No   Able to keep appointments as scheduled Yes   Gender(s) and Name(s) boy   Baby Active/Feeling Fetal Movemen Yes   How are you presently feeling? good   Questions regarding prenatal visits or tests to be ordered? No   Supplies ready for baby Clothing   Resource/Environmental Concerns None   Do you have any questions related to your care experience, your pregnancy, plans for delivery, any concerns, etc? No   Other Education Insurance benefits/Incentives, WIC Benefits        Pt states that everything is going ok although it feels like \"a lot going on\" with all her appointments. She reports frequent fetal movement. Has not had time to work on infant supplies, sent resources for assistance with this. Denies any other resource needs at this time. F/u in one month.     Rosio Galarza RN  Maternity Nurse Navigator    5/31/2023, 11:53 EDT        "

## 2023-05-31 NOTE — LETTER
May 31, 2023     Zeinab Blue MD  [address]  [address]  [place] [id number]    Patient: Ivonne Ingram   YOB: 1990   Date of Visit: 2023       Dear Zeinab Blue MD,    Thank you for referring Ivonne Ingram to me for evaluation. Below is a copy of my consult note.    If you have questions, please do not hesitate to call me. I look forward to following Ivonne along with you.         Sincerely,        Wilma Hough MD    MATERNAL FETAL MEDICINE Consult Note    Dear Dr Zeinab Blue MD:    Thank you for your kind referral of Ivonne Ingram.  As you know, she is a 32 y.o.   at  28 4/7 weeks gestation (Estimated Date of Delivery: 23). This is a consult.      Her antepartum course is complicated by:  Abnormal heart position    Aneuploidy Screening: low risk cell free DNA    HPI: Today, she denies headache, blurry vision, RUQ pain. No vaginal bleeding, no contractions.     Review of History:  Past Medical History:   Diagnosis Date   • Asthma      Past Surgical History:   Procedure Laterality Date   • BREAST IMPLANT SURGERY     • LIPECTOMY         Social History     Socioeconomic History   • Marital status:    Tobacco Use   • Smoking status: Never     Passive exposure: Never   Vaping Use   • Vaping Use: Never used   Substance and Sexual Activity   • Alcohol use: No   • Drug use: No   • Sexual activity: Yes     Family History   Problem Relation Age of Onset   • Hypertension Mother    • Breast cancer Paternal Aunt       Allergies   Allergen Reactions   • Augmentin [Amoxicillin-Pot Clavulanate] Nausea And Vomiting      Current Outpatient Medications on File Prior to Visit   Medication Sig Dispense Refill   • albuterol (PROVENTIL HFA;VENTOLIN HFA) 108 (90 BASE) MCG/ACT inhaler Inhale 2 puffs Every 4 (Four) Hours As Needed for Wheezing.     • amoxicillin (AMOXIL) 400 MG/5ML suspension Take 5 mL by mouth 2 (Two) Times a Day for 10 days. 100 mL 0   • aspirin 81 MG EC tablet  "Take 1 tablet by mouth Daily. 60 tablet 3   • CVS D3 125 MCG (5000 UT) capsule capsule Take 1 capsule by mouth Daily.     • cyclobenzaprine (FLEXERIL) 10 MG tablet Take 1 tablet by mouth Every 8 (Eight) Hours As Needed for Muscle Spasms. 30 tablet 1   • ferrous sulfate 325 (65 FE) MG tablet Take 1 tablet by mouth Daily.     • fluconazole (Diflucan) 150 MG tablet Take 1 tablet by mouth Every 3 (Three) Days. 2 tablet 0   • fluticasone (FLONASE) 50 MCG/ACT nasal spray      • levothyroxine (Synthroid) 75 MCG tablet Take 1 tablet by mouth Daily. 60 tablet 1   • loratadine (Claritin) 10 MG tablet Take 1 tablet by mouth Daily. 90 tablet 3   • LORazepam (Ativan) 1 MG tablet Take 1 tablet by mouth Every 8 (Eight) Hours As Needed for Anxiety (for MRI). 3 tablet 0   • sertraline (Zoloft) 25 MG tablet Take 1 tablet by mouth Daily. 30 tablet 1   • triamcinolone (KENALOG) 0.5 % cream Apply 1 application topically to the appropriate area as directed 2 (Two) Times a Day. 454 g 0   • LORazepam (Ativan) 1 MG tablet Take 1 tablet by mouth Every 8 (Eight) Hours As Needed for Anxiety (For anxiety for MRI). (Patient not taking: Reported on 5/31/2023) 1 tablet 0     No current facility-administered medications on file prior to visit.        Past obstetric, gynecological, medical, surgical, family and social history reviewed.  Relevant lab work and imaging reviewed.    Review of systems  Constitutional:  denies fever, chills, malaise.   ENT/Mouth:  denies sore throat, tinnitis  Eyes: denies vision changes/pain  CV:  denies chest pain  Respiratory:  denies cough/SOB  GI:  denies N/V, diarrhea, abdominal pain.    :   denies dysuria  Skin:  denies lesions or pruritis   Neuro:  denies weakness, focal neurologic symptoms    Vitals:    05/31/23 0944   BP: 118/75   BP Location: Right arm   Patient Position: Sitting   Pulse: 83   Resp: 16   Temp: 97.9 °F (36.6 °C)   TempSrc: Temporal   Weight: 135 kg (298 lb 9.6 oz)   Height: 162.6 cm (64\") "       PHYSICAL EXAM   GENERAL: Not in acute distress, AAOx3, pleasant  CARDIO: regular rate and rhythm  PULM: symmetric chest rise, speaking in complete sentences without difficulty  NEURO: awake, alert and oriented to person, place, and time  ABDOMINAL: No fundal tenderness, no rebound or guarding, gravid  EXTREMITIES: no bilateral lower extremity edema/tenderness  SKIN: Warm, well-perfused      ULTRASOUND   Please view full ultrasound note on Imaging tab in ViewPoint.  Cephalic presentation.  Anterior placenta.  EZEQUIEL 13 cm, which is normal.   EFW 1199 g (46%, AC 26%)  Heart axis appears shifted to the right with no obvious chest mass and intact-appearing diaphragm.     ASSESSMENT/COUNSELIN y.o.   at  28 4/7 weeks gestation (Estimated Date of Delivery: 23).     -Pregnancy  [ X ] stable  [   ] improving [  ] worsening    Diagnoses and all orders for this visit:    1. Suspected fetal anomaly, antepartum, single or unspecified fetus (Primary)    2. Abnormal fetal heart beat first noted during labor or delivery in liveborn infant  -     [facility]; Future  -     [facility]; Standing  -     [facility]    3. Abnormal US (ultrasound) of abdomen         Heart appears to be pushed towards right chest in some views but normal axis and apparently intact diaphragm.    Discussed differential with patient including small CDH, CPAM/mass and normal variation.  The heart appears normal and the axis is normal.  However, in some views, the heart appears to be displaced.  Sometimes a small CDH can cause this, although the diaphragm appears intact on multiple views.      I have seen where a small CDH presents itself later one and just a small loop of bowel is up.  The stomach is down in images.  She did not tolerate a fetal MRI and does not desire to try this again.  We will continue serial growths to watch for signs of late appearing CDH/other  issues.  I'm hopeful this is a normal variant.  To cover our bases, we will also do a fetal to further evaluate--appreciate Dr. Segovia's care.       I discussed in no uncertain terms that US is not perfect and I think it is unlikely but possible that her baby could have an unrecognized issue that could become apparent after delivery.  We will have NICU at least aware so they can evaluate the baby after delivery for any respiratory distress, etc.       Summary of Plan  -Repeat growth 4 weeks  -Fetal ECHO ordered    Follow-up: 4 weeks growth    Thank you for the consult and opportunity to care for this patient.  Please feel free to reach out with any questions or concerns.      I spent 20 minutes caring for this patient on this date of service. This time includes time spent by me in the following activities: preparing for the visit, reviewing tests, obtaining and/or reviewing a separately obtained history, performing a medically appropriate examination and/or evaluation, counseling and educating the patient/family/caregiver and independently interpreting results and communicating that information with the patient/family/caregiver with greater than 50% spent in counseling and coordination of care.       I spent 5 minutes on the separately reported service of US imaging not included in the time used to support the E/M service also reported today.      Wilma Hough MD [facility] [place]aternal Fetal Medicine-[facility]  Office: [phone]  [email or web address]

## 2023-06-14 DIAGNOSIS — O23.40 URINARY TRACT INFECTION IN MOTHER DURING PREGNANCY, ANTEPARTUM: Primary | ICD-10-CM

## 2023-06-14 RX ORDER — CEPHALEXIN 500 MG/1
500 CAPSULE ORAL 3 TIMES DAILY
Qty: 21 CAPSULE | Refills: 0 | Status: SHIPPED | OUTPATIENT
Start: 2023-06-14 | End: 2023-06-21